# Patient Record
Sex: FEMALE | Race: WHITE | Employment: OTHER | URBAN - METROPOLITAN AREA
[De-identification: names, ages, dates, MRNs, and addresses within clinical notes are randomized per-mention and may not be internally consistent; named-entity substitution may affect disease eponyms.]

---

## 2019-07-09 ENCOUNTER — ANESTHESIA EVENT (OUTPATIENT)
Dept: ENDOSCOPY | Age: 74
End: 2019-07-09
Payer: MEDICARE

## 2019-07-09 ENCOUNTER — HOSPITAL ENCOUNTER (OUTPATIENT)
Age: 74
Setting detail: OUTPATIENT SURGERY
Discharge: HOME | End: 2019-07-09
Attending: INTERNAL MEDICINE | Admitting: INTERNAL MEDICINE
Payer: MEDICARE

## 2019-07-09 ENCOUNTER — ANESTHESIA (OUTPATIENT)
Dept: ENDOSCOPY | Age: 74
End: 2019-07-09
Payer: MEDICARE

## 2019-07-09 VITALS
OXYGEN SATURATION: 98 % | HEART RATE: 69 BPM | HEIGHT: 63 IN | BODY MASS INDEX: 29.61 KG/M2 | TEMPERATURE: 98.3 F | RESPIRATION RATE: 25 BRPM | SYSTOLIC BLOOD PRESSURE: 137 MMHG | DIASTOLIC BLOOD PRESSURE: 65 MMHG | WEIGHT: 167.11 LBS

## 2019-07-09 PROCEDURE — 76060000031 HC ANESTHESIA FIRST 0.5 HR: Performed by: INTERNAL MEDICINE

## 2019-07-09 PROCEDURE — 88305 TISSUE EXAM BY PATHOLOGIST: CPT

## 2019-07-09 PROCEDURE — 74011250636 HC RX REV CODE- 250/636: Performed by: INTERNAL MEDICINE

## 2019-07-09 PROCEDURE — 74011250636 HC RX REV CODE- 250/636

## 2019-07-09 PROCEDURE — 77030013992 HC SNR POLYP ENDOSC BSC -B: Performed by: INTERNAL MEDICINE

## 2019-07-09 PROCEDURE — 76040000019: Performed by: INTERNAL MEDICINE

## 2019-07-09 RX ORDER — CHOLECALCIFEROL (VITAMIN D3) 125 MCG
1 CAPSULE ORAL EVERY MORNING
COMMUNITY

## 2019-07-09 RX ORDER — PROPOFOL 10 MG/ML
INJECTION, EMULSION INTRAVENOUS AS NEEDED
Status: DISCONTINUED | OUTPATIENT
Start: 2019-07-09 | End: 2019-07-09 | Stop reason: HOSPADM

## 2019-07-09 RX ORDER — DEXTROMETHORPHAN/PSEUDOEPHED 2.5-7.5/.8
1.2 DROPS ORAL
Status: DISCONTINUED | OUTPATIENT
Start: 2019-07-09 | End: 2019-07-09 | Stop reason: HOSPADM

## 2019-07-09 RX ORDER — MIDAZOLAM HYDROCHLORIDE 1 MG/ML
.25-5 INJECTION, SOLUTION INTRAMUSCULAR; INTRAVENOUS
Status: DISCONTINUED | OUTPATIENT
Start: 2019-07-09 | End: 2019-07-09 | Stop reason: HOSPADM

## 2019-07-09 RX ORDER — SODIUM CHLORIDE 9 MG/ML
50 INJECTION, SOLUTION INTRAVENOUS CONTINUOUS
Status: DISCONTINUED | OUTPATIENT
Start: 2019-07-09 | End: 2019-07-09 | Stop reason: HOSPADM

## 2019-07-09 RX ORDER — PROPOFOL 10 MG/ML
INJECTION, EMULSION INTRAVENOUS
Status: DISCONTINUED | OUTPATIENT
Start: 2019-07-09 | End: 2019-07-09 | Stop reason: HOSPADM

## 2019-07-09 RX ORDER — EPINEPHRINE 0.1 MG/ML
1 INJECTION INTRACARDIAC; INTRAVENOUS
Status: DISCONTINUED | OUTPATIENT
Start: 2019-07-09 | End: 2019-07-09 | Stop reason: HOSPADM

## 2019-07-09 RX ORDER — HYDROCHLOROTHIAZIDE 12.5 MG/1
12.5 TABLET ORAL DAILY
COMMUNITY
End: 2022-06-22

## 2019-07-09 RX ORDER — FLUMAZENIL 0.1 MG/ML
0.2 INJECTION INTRAVENOUS
Status: DISCONTINUED | OUTPATIENT
Start: 2019-07-09 | End: 2019-07-09 | Stop reason: HOSPADM

## 2019-07-09 RX ORDER — ATROPINE SULFATE 0.1 MG/ML
0.4 INJECTION INTRAVENOUS
Status: DISCONTINUED | OUTPATIENT
Start: 2019-07-09 | End: 2019-07-09 | Stop reason: HOSPADM

## 2019-07-09 RX ORDER — NALOXONE HYDROCHLORIDE 0.4 MG/ML
0.4 INJECTION, SOLUTION INTRAMUSCULAR; INTRAVENOUS; SUBCUTANEOUS
Status: DISCONTINUED | OUTPATIENT
Start: 2019-07-09 | End: 2019-07-09 | Stop reason: HOSPADM

## 2019-07-09 RX ORDER — OMEPRAZOLE 40 MG/1
40 CAPSULE, DELAYED RELEASE ORAL EVERY OTHER DAY
COMMUNITY
End: 2022-06-22

## 2019-07-09 RX ADMIN — PROPOFOL 30 MG: 10 INJECTION, EMULSION INTRAVENOUS at 12:58

## 2019-07-09 RX ADMIN — PROPOFOL 100 MCG/KG/MIN: 10 INJECTION, EMULSION INTRAVENOUS at 12:58

## 2019-07-09 RX ADMIN — PROPOFOL 20 MG: 10 INJECTION, EMULSION INTRAVENOUS at 13:03

## 2019-07-09 RX ADMIN — PROPOFOL 20 MG: 10 INJECTION, EMULSION INTRAVENOUS at 13:00

## 2019-07-09 RX ADMIN — PROPOFOL 20 MG: 10 INJECTION, EMULSION INTRAVENOUS at 13:02

## 2019-07-09 RX ADMIN — PROPOFOL 10 MG: 10 INJECTION, EMULSION INTRAVENOUS at 13:19

## 2019-07-09 RX ADMIN — SODIUM CHLORIDE 50 ML/HR: 900 INJECTION, SOLUTION INTRAVENOUS at 12:06

## 2019-07-09 NOTE — ANESTHESIA POSTPROCEDURE EVALUATION
Procedure(s):  COLONOSCOPY  ENDOSCOPIC POLYPECTOMY. No value filed. Anesthesia Post Evaluation        Patient location during evaluation: bedside  Level of consciousness: awake  Pain management: satisfactory to patient  Airway patency: patent  Anesthetic complications: no  Cardiovascular status: acceptable  Respiratory status: acceptable  Hydration status: acceptable        Vitals Value Taken Time   /66 7/9/2019  1:51 PM   Temp     Pulse 67 7/9/2019  1:52 PM   Resp 16 7/9/2019  1:52 PM   SpO2 100 % 7/9/2019  1:52 PM   Vitals shown include unvalidated device data.

## 2019-07-09 NOTE — DISCHARGE INSTRUCTIONS
[address] [facility]. Lona Champagne M.D.  [phone]            COLON DISCHARGE INSTRUCTIONS       2019    Sabi Mcdonough  :  1945  Janny Medical Record Number:  [id number]      COLONOSCOPY FINDINGS:  Your colonoscopy showed two small polyps that were removed and sent to pathology, diverticulosis and small internal hemorrhoids. DISCOMFORT:  Redness at IV site- apply warm compress to area; if redness or soreness persist- contact your physician  There may be a slight amount of blood passed from the rectum  Gaseous discomfort- walking, belching will help relieve any discomfort  You may not operate a vehicle for 12 hours  You may not engage in an occupation involving machinery or appliances for rest of today  You may not drink alcoholic beverages for at least 12 hours  Avoid making any critical decisions for at least 24 hour  DIET:   High fiber diet. - however -  remember your colon is empty and a heavy meal will produce gas. Avoid these foods:  vegetables, fried / greasy foods, carbonated drinks for today     ACTIVITY:  You may resume your normal daily activities it is recommended that you spend the remainder of the day resting -  avoid any strenuous activity. CALL M.D. ANY SIGN OF:   Increasing pain, nausea, vomiting  Abdominal distension (swelling)  New increased bleeding (oral or rectal)  Fever (chills)  Pain in chest area  Bloody discharge from nose or mouth   Shortness of breath    Follow-up Instructions:   Call Dr. Debbie Gallagher if any questions or problems. Telephone # [phone]  Biopsy results will be available in  5 to 7 days  Should have a repeat colonoscopy in 3 to 5 years, based on pathology results.

## 2019-07-09 NOTE — ROUTINE PROCESS
Benjamin Going  1945  [id number]    Situation:  Verbal report received from: Carmita Davies  Procedure: Procedure(s):  COLONOSCOPY  ENDOSCOPIC POLYPECTOMY    Background:    Preoperative diagnosis: FAMILY HISTORY COLON CANCER, PERSONAL HISTORY COLON POLYPS  Postoperative diagnosis: Diverticulosis, polyps. :  Dr. Cristiano Chavez  Assistant(s): Endoscopy RN-1: Rocky Burks RN  Endoscopy RN-2: Peter Rodriguez RN    Specimens:   ID Type Source Tests Collected by Time Destination   1 : cecum polyp Preservative Cecum  Vivienne Sutton MD 7/9/2019 1311 Pathology   2 : transverse colon polyp Preservative Colon, Transverse  Vivienne Sutton MD 7/9/2019 1313 Pathology     H. Pylori  no    Assessment:  Intra-procedure medications     Anesthesia gave intra-procedure sedation and medications, see anesthesia flow sheet yes    Intravenous fluids: NS@ KVO     Vital signs stable     Abdominal assessment: round and soft     Recommendation:  Discharge patient per MD order.   Family or Friend   Permission to share finding with family or friend yes

## 2019-07-09 NOTE — ANESTHESIA PREPROCEDURE EVALUATION
Relevant Problems   No relevant active problems       Anesthetic History   No history of anesthetic complications            Review of Systems / Medical History  Patient summary reviewed, nursing notes reviewed and pertinent labs reviewed    Pulmonary  Within defined limits                 Neuro/Psych   Within defined limits           Cardiovascular    Hypertension                   GI/Hepatic/Renal     GERD           Endo/Other  Within defined limits           Other Findings              Physical Exam    Airway  Mallampati: II  TM Distance: 4 - 6 cm  Neck ROM: normal range of motion   Mouth opening: Normal     Cardiovascular    Rhythm: regular  Rate: normal         Dental  No notable dental hx       Pulmonary  Breath sounds clear to auscultation               Abdominal         Other Findings            Anesthetic Plan    ASA: 2              Anesthetic plan and risks discussed with: Patient

## 2019-07-09 NOTE — PROCEDURES
Chantel Leon M.D.  [phone]            2019          Colonoscopy Operative Report  Sean Lopez  :  1945  Janny Medical Record Number:  [id number]      Indications:    Family history of coloretal cancer (screening only), Personal history of colon polyps (screening only)     :  Chi Rodriguez MD    Referring Provider: Huong Acuna MD    Sedation:  MAC anesthesia    Pre-Procedural Exam:      Airway: clear,  No airway problems anticipated  Heart: RRR, without gallops or rubs  Lungs: clear bilaterally without wheezes, crackles, or rhonchi  Abdomen: soft, nontender, nondistended, bowel sounds present  Mental Status: awake, alert and oriented to person, place and time     Procedure Details:  After informed consent was obtained with all risks and benefits of procedure explained and preoperative exam completed, the patient was taken to the endoscopy suite and placed in the left lateral decubitus position. Upon sequential sedation as per above, a digital rectal exam was performed. The Olympus videocolonoscope  was inserted in the rectum and carefully advanced to the cecum, which was identified by the ileocecal valve and appendiceal orifice. The quality of preparation was good. The colonoscope was slowly withdrawn with careful inspection and evaluation between folds. Retroflexion in the rectum was performed. Findings:   Terminal Ileum: normal  Cecum: 1  Sessile polyp(s), the largest 3 mm in size;  Ascending Colon: normal  Transverse Colon: 1  Sessile polyp(s), the largest 5 mm in size; Descending Colon: normal  Sigmoid: no mucosal lesion appreciated  moderate diverticulosis; Rectum: no mucosal lesion appreciated  Grade 1 internal hemorrhoid(s);     Interventions:  2 complete polypectomy were performed using cold and hot snare  and the polyps were  retrieved    Specimen Removed:  specimen #1, 3 mm in size, located in the cecum removed by cold snare and retrieved for pathology  #2, 5 mm in size, located in the transverse colon removed by snare cautery and retrieved for pathology    Complications: None. EBL:  None. Impression:  Two polyps removed and sent to pathology, otherwise mucosa within normal                         Sigmoid diverticulosis with tortuous lumen, pediatric scope was used to pass. Recommendations:  -If adenoma is present, repeat colonoscopy in 3 years.   -High fiber diet.    -Resume normal medication(s). Discharge Disposition:  Home in the company of a  when able to ambulate.     Julee Diop MD  7/9/2019  1:23 PM

## 2019-07-09 NOTE — PERIOP NOTES
1257  Anesthesia staff at patient's bedside administering anesthesia and monitoring patients vital signs throughout procedure. See anesthesia note. 1322  Endoscope was pre-cleaned at bedside immediately following procedure by Malcom Holman endo tech. 1324  Patient tolerated procedure without problems. Abdomen soft and patient arousable and voices no complaints Report received from CRNA, see anesthesia note. Patient transported to endoscopy recovery area. Report given to Mika Stephens RN.

## 2019-07-09 NOTE — H&P
Amador Hendricks M.D.  [phone]            History and Physical       NAME:  Brett Cespedes   :   1945   MRN:   [id number]           Consult Date: 2019 12:57 PM    Chief Complaint:  Colon cancer screening    History of Present Illness:  Patient is a 68 y.o. who is seen for colon cancer screening. Denies any ongoing GI complaints. She has history of colon polyps and family history of colon cancer, her father and brother. PMH:  Past Medical History:   Diagnosis Date    Cancer ([address]     uterin, endometrial     GERD (gastroesophageal reflux disease)     Hypertension        PSH:  Past Surgical History:   Procedure Laterality Date    HX OPEN CHOLECYSTECTOMY      HX ORTHOPAEDIC Bilateral     arthroscopic knee    HX OTHER SURGICAL Right     kidney donation    HX TOTAL ABDOMINAL HYSTERECTOMY         Allergies: Allergies   Allergen Reactions    Demerol [Meperidine] Nausea and Vomiting       Home Medications:  Prior to Admission Medications   Prescriptions Last Dose Informant Patient Reported? Taking? cholecalciferol, vitamin D3, (VITAMIN D3) 2,000 unit tab   Yes Yes   Sig: Take 1 Tab by mouth daily. Indications: Prevention of Vitamin D Deficiency   hydroCHLOROthiazide (HYDRODIURIL) 12.5 mg tablet   Yes Yes   Sig: Take 12.5 mg by mouth daily. Indications: high blood pressure   omeprazole (PRILOSEC) 40 mg capsule   Yes Yes   Sig: Take 40 mg by mouth every other day.  Indications: gastroesophageal reflux disease      Facility-Administered Medications: None       Hospital Medications:  Current Facility-Administered Medications   Medication Dose Route Frequency    0.9% sodium chloride infusion  50 mL/hr IntraVENous CONTINUOUS    midazolam (VERSED) injection 0.25-5 mg  0.25-5 mg IntraVENous Multiple    naloxone (NARCAN) injection 0.4 mg  0.4 mg IntraVENous Multiple    flumazenil (ROMAZICON) 0.1 mg/mL injection 0.2 mg  0.2 mg IntraVENous Multiple    simethicone (MYLICON) 51BH/0.4MM oral drops 80 mg  1.2 mL Oral Multiple    atropine injection 0.4 mg  0.4 mg IntraVENous ONCE PRN    EPINEPHrine (ADRENALIN) 0.1 mg/mL syringe 1 mg  1 mg Endoscopically ONCE PRN       Social History:  Social History     Tobacco Use    Smoking status: Former Smoker     Packs/day: 0.25     Years: 10.00     Pack years: 2.50     Last attempt to quit:      Years since quittin.5    Smokeless tobacco: Never Used   Substance Use Topics    Alcohol use: Yes     Alcohol/week: 1.2 oz     Types: 2 Glasses of wine per week       Family History:  Family History   Problem Relation Age of Onset    Cancer Father 79        colon/liver    Cancer Brother 79        colon             Review of Systems:      Constitutional: negative fever, negative chills, negative weight loss  Eyes:   negative visual changes  ENT:   negative sore throat, tongue or lip swelling  Respiratory:  negative cough, negative dyspnea  Cards:  negative for chest pain, palpitations, lower extremity edema  GI:   See HPI  :  negative for frequency, dysuria  Integument:  negative for rash and pruritus  Heme:  negative for easy bruising and gum/nose bleeding  Musculoskel: negative for myalgias,  back pain and muscle weakness  Neuro: negative for headaches, dizziness, vertigo  Psych:  negative for feelings of anxiety, depression       Objective:     Patient Vitals for the past 8 hrs:   BP Temp Pulse Resp SpO2 Height Weight   19 1153 144/74 98.3 °F (36.8 °C) 77 15 98 % 5' 3\" (1.6 m) 75.8 kg (167 lb 1.7 oz)     No intake/output data recorded. No intake/output data recorded. EXAM:     NEURO-a&o   HEENT-wnl   LUNGS-clear    COR-regular rate and rhythym     ABD-soft , no tenderness, no rebound, good bs     EXT-no edema     Data Review     No results for input(s): WBC, HGB, HCT, PLT, HGBEXT, HCTEXT, PLTEXT in the last 72 hours. No results for input(s): NA, K, CL, CO2, BUN, CREA, GLU, PHOS, CA in the last 72 hours.   No results for input(s): SGOT, GPT, AP, TBIL, TP, ALB, GLOB, GGT, AML, LPSE in the last 72 hours. No lab exists for component: AMYP, HLPSE  No results for input(s): INR, PTP, APTT in the last 72 hours. No lab exists for component: INREXT    There is no problem list on file for this patient. Assessment:   · Colon cancer screening   Plan:   · Colonoscopy today.      Signed By: Christine Fontaine MD     7/9/2019  12:57 PM

## 2020-01-24 ENCOUNTER — HOSPITAL ENCOUNTER (OUTPATIENT)
Dept: MAMMOGRAPHY | Age: 75
Discharge: HOME | End: 2020-01-24
Attending: FAMILY MEDICINE
Payer: MEDICARE

## 2020-01-24 DIAGNOSIS — Z12.31 VISIT FOR SCREENING MAMMOGRAM: ICD-10-CM

## 2020-01-24 PROCEDURE — 77067 SCR MAMMO BI INCL CAD: CPT

## 2021-01-25 ENCOUNTER — TRANSCRIBE ORDER (OUTPATIENT)
Dept: SCHEDULING | Age: 76
End: 2021-01-25

## 2021-01-25 DIAGNOSIS — Z78.0 MENOPAUSE: Primary | ICD-10-CM

## 2021-02-02 ENCOUNTER — HOSPITAL ENCOUNTER (OUTPATIENT)
Dept: MAMMOGRAPHY | Age: 76
Discharge: HOME | End: 2021-02-02
Attending: FAMILY MEDICINE
Payer: MEDICARE

## 2021-02-02 DIAGNOSIS — Z78.0 MENOPAUSE: ICD-10-CM

## 2021-02-02 PROCEDURE — 77080 DXA BONE DENSITY AXIAL: CPT

## 2021-04-23 ENCOUNTER — TRANSCRIBE ORDER (OUTPATIENT)
Dept: SCHEDULING | Age: 76
End: 2021-04-23

## 2021-04-23 DIAGNOSIS — Z12.31 SCREENING MAMMOGRAM FOR HIGH-RISK PATIENT: Primary | ICD-10-CM

## 2021-04-29 ENCOUNTER — HOSPITAL ENCOUNTER (OUTPATIENT)
Dept: MAMMOGRAPHY | Age: 76
Discharge: HOME | End: 2021-04-29
Attending: FAMILY MEDICINE
Payer: MEDICARE

## 2021-04-29 DIAGNOSIS — Z12.31 SCREENING MAMMOGRAM FOR HIGH-RISK PATIENT: ICD-10-CM

## 2021-04-29 PROCEDURE — 77067 SCR MAMMO BI INCL CAD: CPT

## 2022-03-08 ENCOUNTER — TRANSCRIBE ORDER (OUTPATIENT)
Dept: SCHEDULING | Age: 77
End: 2022-03-08

## 2022-03-08 DIAGNOSIS — M25.512 BILATERAL SHOULDER PAIN, UNSPECIFIED CHRONICITY: ICD-10-CM

## 2022-03-08 DIAGNOSIS — M19.011 OSTEOARTHRITIS OF RIGHT GLENOHUMERAL JOINT: Primary | ICD-10-CM

## 2022-03-08 DIAGNOSIS — M25.511 BILATERAL SHOULDER PAIN, UNSPECIFIED CHRONICITY: ICD-10-CM

## 2022-03-09 ENCOUNTER — TRANSCRIBE ORDER (OUTPATIENT)
Dept: SCHEDULING | Age: 77
End: 2022-03-09

## 2022-03-09 DIAGNOSIS — M25.511 BILATERAL SHOULDER PAIN, UNSPECIFIED CHRONICITY: ICD-10-CM

## 2022-03-09 DIAGNOSIS — M19.012 OSTEOARTHRITIS OF LEFT GLENOHUMERAL JOINT: ICD-10-CM

## 2022-03-09 DIAGNOSIS — M19.011 OSTEOARTHRITIS OF RIGHT GLENOHUMERAL JOINT: Primary | ICD-10-CM

## 2022-03-09 DIAGNOSIS — M25.512 BILATERAL SHOULDER PAIN, UNSPECIFIED CHRONICITY: ICD-10-CM

## 2022-03-14 ENCOUNTER — HOSPITAL ENCOUNTER (OUTPATIENT)
Dept: CT IMAGING | Age: 77
Discharge: HOME | End: 2022-03-14
Attending: ORTHOPAEDIC SURGERY
Payer: MEDICARE

## 2022-03-14 DIAGNOSIS — M19.012 OSTEOARTHRITIS OF LEFT GLENOHUMERAL JOINT: ICD-10-CM

## 2022-03-14 DIAGNOSIS — M25.512 BILATERAL SHOULDER PAIN, UNSPECIFIED CHRONICITY: ICD-10-CM

## 2022-03-14 DIAGNOSIS — M19.011 OSTEOARTHRITIS OF RIGHT GLENOHUMERAL JOINT: ICD-10-CM

## 2022-03-14 DIAGNOSIS — M25.511 BILATERAL SHOULDER PAIN, UNSPECIFIED CHRONICITY: ICD-10-CM

## 2022-03-14 PROCEDURE — 73200 CT UPPER EXTREMITY W/O DYE: CPT

## 2022-04-21 ENCOUNTER — TRANSCRIBE ORDER (OUTPATIENT)
Dept: SCHEDULING | Age: 77
End: 2022-04-21

## 2022-04-21 DIAGNOSIS — Z12.31 ENCOUNTER FOR SCREENING MAMMOGRAM FOR MALIGNANT NEOPLASM OF BREAST: Primary | ICD-10-CM

## 2022-05-10 ENCOUNTER — HOSPITAL ENCOUNTER (OUTPATIENT)
Dept: MAMMOGRAPHY | Age: 77
Discharge: HOME | End: 2022-05-10
Attending: FAMILY MEDICINE
Payer: MEDICARE

## 2022-05-10 DIAGNOSIS — Z12.31 ENCOUNTER FOR SCREENING MAMMOGRAM FOR MALIGNANT NEOPLASM OF BREAST: ICD-10-CM

## 2022-05-10 PROCEDURE — 77067 SCR MAMMO BI INCL CAD: CPT

## 2022-06-22 ENCOUNTER — HOSPITAL ENCOUNTER (OUTPATIENT)
Dept: PREADMISSION TESTING | Age: 77
Discharge: HOME | End: 2022-06-22
Payer: MEDICARE

## 2022-06-22 VITALS
OXYGEN SATURATION: 96 % | RESPIRATION RATE: 16 BRPM | TEMPERATURE: 96.9 F | HEIGHT: 63 IN | HEART RATE: 77 BPM | SYSTOLIC BLOOD PRESSURE: 142 MMHG | BODY MASS INDEX: 29.88 KG/M2 | DIASTOLIC BLOOD PRESSURE: 64 MMHG | WEIGHT: 168.6 LBS

## 2022-06-22 LAB
ABO + RH BLD: NORMAL
ALBUMIN SERPL-MCNC: 3.7 G/DL (ref 3.5–5)
ALBUMIN/GLOB SERPL: 1.1 {RATIO} (ref 1.1–2.2)
ALP SERPL-CCNC: 71 U/L (ref 45–117)
ALT SERPL-CCNC: 20 U/L (ref 12–78)
ANION GAP SERPL CALC-SCNC: 8 MMOL/L (ref 5–15)
APPEARANCE UR: CLEAR
APTT PPP: 25.9 SEC (ref 22.1–31)
AST SERPL-CCNC: 17 U/L (ref 15–37)
BACTERIA URNS QL MICRO: NEGATIVE /HPF
BASOPHILS # BLD: 0.1 K/UL (ref 0–0.1)
BASOPHILS NFR BLD: 1 % (ref 0–1)
BILIRUB SERPL-MCNC: 0.3 MG/DL (ref 0.2–1)
BILIRUB UR QL: NEGATIVE
BLOOD GROUP ANTIBODIES SERPL: NORMAL
BUN SERPL-MCNC: 12 MG/DL (ref 6–20)
BUN/CREAT SERPL: 21 (ref 12–20)
CALCIUM SERPL-MCNC: 9.8 MG/DL (ref 8.5–10.1)
CHLORIDE SERPL-SCNC: 108 MMOL/L (ref 97–108)
CO2 SERPL-SCNC: 27 MMOL/L (ref 21–32)
COLOR UR: NORMAL
CREAT SERPL-MCNC: 0.57 MG/DL (ref 0.55–1.02)
DIFFERENTIAL METHOD BLD: NORMAL
EOSINOPHIL # BLD: 0.2 K/UL (ref 0–0.4)
EOSINOPHIL NFR BLD: 3 % (ref 0–7)
EPITH CASTS URNS QL MICRO: NORMAL /LPF
ERYTHROCYTE [DISTWIDTH] IN BLOOD BY AUTOMATED COUNT: 14 % (ref 11.5–14.5)
EST. AVERAGE GLUCOSE BLD GHB EST-MCNC: 114 MG/DL
GLOBULIN SER CALC-MCNC: 3.3 G/DL (ref 2–4)
GLUCOSE SERPL-MCNC: 94 MG/DL (ref 65–100)
GLUCOSE UR STRIP.AUTO-MCNC: NEGATIVE MG/DL
HBA1C MFR BLD: 5.6 % (ref 4–5.6)
HCT VFR BLD AUTO: 44.6 % (ref 35–47)
HGB BLD-MCNC: 14.4 G/DL (ref 11.5–16)
HGB UR QL STRIP: NEGATIVE
HYALINE CASTS URNS QL MICRO: NORMAL /LPF (ref 0–2)
IMM GRANULOCYTES # BLD AUTO: 0 K/UL (ref 0–0.04)
IMM GRANULOCYTES NFR BLD AUTO: 0 % (ref 0–0.5)
INR PPP: 1 (ref 0.9–1.1)
KETONES UR QL STRIP.AUTO: NEGATIVE MG/DL
LEUKOCYTE ESTERASE UR QL STRIP.AUTO: NEGATIVE
LYMPHOCYTES # BLD: 1.6 K/UL (ref 0.8–3.5)
LYMPHOCYTES NFR BLD: 21 % (ref 12–49)
MCH RBC QN AUTO: 29.4 PG (ref 26–34)
MCHC RBC AUTO-ENTMCNC: 32.3 G/DL (ref 30–36.5)
MCV RBC AUTO: 91 FL (ref 80–99)
MONOCYTES # BLD: 0.7 K/UL (ref 0–1)
MONOCYTES NFR BLD: 9 % (ref 5–13)
NEUTS SEG # BLD: 5 K/UL (ref 1.8–8)
NEUTS SEG NFR BLD: 66 % (ref 32–75)
NITRITE UR QL STRIP.AUTO: NEGATIVE
NRBC # BLD: 0 K/UL (ref 0–0.01)
NRBC BLD-RTO: 0 PER 100 WBC
PH UR STRIP: 5.5 [PH] (ref 5–8)
PLATELET # BLD AUTO: 265 K/UL (ref 150–400)
PMV BLD AUTO: 9.2 FL (ref 8.9–12.9)
POTASSIUM SERPL-SCNC: 4.3 MMOL/L (ref 3.5–5.1)
PROT SERPL-MCNC: 7 G/DL (ref 6.4–8.2)
PROT UR STRIP-MCNC: NEGATIVE MG/DL
PROTHROMBIN TIME: 10.3 SEC (ref 9–11.1)
RBC # BLD AUTO: 4.9 M/UL (ref 3.8–5.2)
RBC #/AREA URNS HPF: NORMAL /HPF (ref 0–5)
SODIUM SERPL-SCNC: 143 MMOL/L (ref 136–145)
SP GR UR REFRACTOMETRY: <1.005 (ref 1–1.03)
SPECIMEN EXP DATE BLD: NORMAL
THERAPEUTIC RANGE,PTTT: NORMAL SECS (ref 58–77)
UA: UC IF INDICATED,UAUC: NORMAL
UROBILINOGEN UR QL STRIP.AUTO: 0.2 EU/DL (ref 0.2–1)
WBC # BLD AUTO: 7.6 K/UL (ref 3.6–11)
WBC URNS QL MICRO: NORMAL /HPF (ref 0–4)

## 2022-06-22 PROCEDURE — 80053 COMPREHEN METABOLIC PANEL: CPT

## 2022-06-22 PROCEDURE — 86900 BLOOD TYPING SEROLOGIC ABO: CPT

## 2022-06-22 PROCEDURE — 93005 ELECTROCARDIOGRAM TRACING: CPT

## 2022-06-22 PROCEDURE — 85610 PROTHROMBIN TIME: CPT

## 2022-06-22 PROCEDURE — 81001 URINALYSIS AUTO W/SCOPE: CPT

## 2022-06-22 PROCEDURE — 85730 THROMBOPLASTIN TIME PARTIAL: CPT

## 2022-06-22 PROCEDURE — 85025 COMPLETE CBC W/AUTO DIFF WBC: CPT

## 2022-06-22 PROCEDURE — 83036 HEMOGLOBIN GLYCOSYLATED A1C: CPT

## 2022-06-22 PROCEDURE — 36415 COLL VENOUS BLD VENIPUNCTURE: CPT

## 2022-06-22 RX ORDER — UBIDECARENONE 50 MG
2 CAPSULE ORAL 2 TIMES DAILY
COMMUNITY

## 2022-06-22 RX ORDER — ERGOCALCIFEROL 1.25 MG/1
50000 CAPSULE ORAL
COMMUNITY

## 2022-06-22 RX ORDER — ASCORBIC ACID 500 MG
500 TABLET ORAL EVERY MORNING
COMMUNITY

## 2022-06-22 NOTE — H&P (VIEW-ONLY)
History and Physical    Patient: Dena Bonner MRN: [id number]  SSN: [id number]    YOB: 1945  Age: 77 y.o. Sex: female      CC: Right shoulder pain    Subjective: Dena Bonner is a 77 y.o. female referred for pre-operative evaluation by Dr. Stephani Fowler for surgery on 22. Rajani Mcintosh notes that her shoulder has been painful for many years but now the pain is limiting her activity. She notes both shoulders are painful and she will be replacing the left one in January. She is LHD. She notes limited ROM to shoulder. The patient was evaluated in the surgeon's office and it was determined that the most appropriate plan of care is to proceed with surgical intervention. Patient's PCP Kanu Gipson MD    Past Medical History:   Diagnosis Date    Endometrial cancer ([address]     Exocrine pancreatic insufficiency     GERD (gastroesophageal reflux disease)     H/O kidney donation      Past Surgical History:   Procedure Laterality Date    COLONOSCOPY N/A 2019    COLONOSCOPY performed by Ernst Hendricks MD at [facility]    HX HIP REPLACEMENT Bilateral     HX KNEE ARTHROSCOPY Bilateral     HX OPEN CHOLECYSTECTOMY      HX ORTHOPAEDIC Left     [employer] RELEASE    HX OTHER SURGICAL Right     kidney donation    HX TOTAL ABDOMINAL HYSTERECTOMY        Family History   Problem Relation Age of Onset    Cancer Father 79        colon/liver    Cancer Brother 79        colon    Breast Cancer Maternal Grandmother     Breast Cancer Maternal Aunt      Social History     Tobacco Use    Smoking status: Former Smoker     Packs/day: 0.25     Years: 10.00     Pack years: 2.50     Types: Cigarettes     Quit date:      Years since quittin.5    Smokeless tobacco: Never Used   Substance Use Topics    Alcohol use:  Yes     Alcohol/week: 2.0 standard drinks     Types: 2 Glasses of wine per week    Drug use: Never      Prior to Admission medications Medication Sig Start Date End Date Taking? Authorizing Provider   ubidecarenone/vitamin E mixed (COQ10  PO) Take 1 Tablet by mouth two (2) times a day. Yes Provider, Historical   lipase/protease/amylase (ZENPEP PO) Take 2 Caplet by mouth three (3) times daily (with meals). Yes Provider, Historical   ascorbic acid, vitamin C, (Vitamin C) 500 mg tablet Take 500 mg by mouth Every morning. Yes Provider, Historical   ergocalciferol (Vitamin D2) 1,250 mcg (50,000 unit) capsule Take 50,000 Units by mouth every seven (7) days. Yes Provider, Historical   lipase/protease/amylase (ZENPEP PO) Take 1 Caplet by mouth. WITH EVERY SNACK   Yes Provider, Historical   red yeast rice 600 mg tab Take 2 Caplet by mouth two (2) times a day. Yes Provider, Historical   cholecalciferol, vitamin D3, (VITAMIN D3) 2,000 unit tab Take 1 Tablet by mouth Every morning. Indications: prevention of vitamin D deficiency   Yes Provider, Historical        Allergies   Allergen Reactions    Demerol [Meperidine] Nausea and Vomiting       Review of Systems:  Constitutional: Negative for chills and fever  HENT: Negative for congestion and sore throat  Eyes: negative for blurred vision and double vision  Respiratory: Negative for cough, shortness of breath and wheezing  Mouth: Negative for loose, broken or chipped teeth. Cardiovascular: Negative for chest pain and palpitations  Gastrointestinal: Negative for abdominal pain, constipation, diarrhea and nausea  Genitourinary: Negative for dysuria and hematuria  Musculoskeletal: Bilateral shoulder pain  Skin: Negative for rash, open wounds.    Neurological: Negative for dizziness, tremors and headaches  Psychiatric: Negative for anxiety    Objective:     Vitals:    06/22/22 0951   BP: (!) 142/64   Pulse: 77   Resp: 16   Temp: 96.9 °F (36.1 °C)   SpO2: 96%   Weight: 76.5 kg (168 lb 9.6 oz)   Height: 5' 3\" (1.6 m)        Physical Exam:  General Appearance: Alert, Well Appearing and in no distress  Mental Status: Normal mood, behavior, speech and dress  Neck: Normal appearance externally  Ears: External canal no drainage  Nose: Normal external appearance and no drainage   Chest: Clear to auscultation, no wheezes, rales or rhonchi  Heart: Normal rate, regular rhythm, no murmurs, rubs, clicks or gallops  Skin: Normal color, no lesions externally  Abdomen: Not examined  Neuro: Not examined  Musculoskeletal: Limited ROM to both shoulders    Recent Results (from the past 168 hour(s))   CBC WITH AUTOMATED DIFF    Collection Time: 06/22/22 10:30 AM   Result Value Ref Range    WBC 7.6 3.6 - 11.0 K/uL    RBC 4.90 3.80 - 5.20 M/uL    HGB 14.4 11.5 - 16.0 g/dL    HCT 44.6 35.0 - 47.0 %    MCV 91.0 80.0 - 99.0 FL    MCH 29.4 26.0 - 34.0 PG    MCHC 32.3 30.0 - 36.5 g/dL    RDW 14.0 11.5 - 14.5 %    PLATELET 351 208 - 680 K/uL    MPV 9.2 8.9 - 12.9 FL    NRBC 0.0 0  WBC    ABSOLUTE NRBC 0.00 0.00 - 0.01 K/uL    NEUTROPHILS 66 32 - 75 %    LYMPHOCYTES 21 12 - 49 %    MONOCYTES 9 5 - 13 %    EOSINOPHILS 3 0 - 7 %    BASOPHILS 1 0 - 1 %    IMMATURE GRANULOCYTES 0 0.0 - 0.5 %    ABS. NEUTROPHILS 5.0 1.8 - 8.0 K/UL    ABS. LYMPHOCYTES 1.6 0.8 - 3.5 K/UL    ABS. MONOCYTES 0.7 0.0 - 1.0 K/UL    ABS. EOSINOPHILS 0.2 0.0 - 0.4 K/UL    ABS. BASOPHILS 0.1 0.0 - 0.1 K/UL    ABS. IMM. GRANS. 0.0 0.00 - 0.04 K/UL    DF AUTOMATED     METABOLIC PANEL, COMPREHENSIVE    Collection Time: 06/22/22 10:30 AM   Result Value Ref Range    Sodium 143 136 - 145 mmol/L    Potassium 4.3 3.5 - 5.1 mmol/L    Chloride 108 97 - 108 mmol/L    CO2 27 21 - 32 mmol/L    Anion gap 8 5 - 15 mmol/L    Glucose 94 65 - 100 mg/dL    BUN 12 6 - 20 MG/DL    Creatinine 0.57 0.55 - 1.02 MG/DL    BUN/Creatinine ratio 21 (H) 12 - 20      GFR est AA >60 >60 ml/min/1.73m2    GFR est non-AA >60 >60 ml/min/1.73m2    Calcium 9.8 8.5 - 10.1 MG/DL    Bilirubin, total 0.3 0.2 - 1.0 MG/DL    ALT (SGPT) 20 12 - 78 U/L    AST (SGOT) 17 15 - 37 U/L    Alk. phosphatase 71 45 - 117 U/L    Protein, total 7.0 6.4 - 8.2 g/dL    Albumin 3.7 3.5 - 5.0 g/dL    Globulin 3.3 2.0 - 4.0 g/dL    A-G Ratio 1.1 1.1 - 2.2     HEMOGLOBIN A1C WITH EAG    Collection Time: 06/22/22 10:30 AM   Result Value Ref Range    Hemoglobin A1c 5.6 4.0 - 5.6 %    Est. average glucose 114 mg/dL   CULTURE, MRSA    Collection Time: 06/22/22 10:30 AM    Specimen: Nares; Nasal   Result Value Ref Range    Special Requests: NO SPECIAL REQUESTS      Culture result: MRSA NOT PRESENT      Culture result:        Screening of patient nares for MRSA is for surveillance purposes and, if positive, to facilitate isolation considerations in high risk settings. It is not intended for automatic decolonization interventions per se as regimens are not sufficiently effective to warrant routine use.    PROTHROMBIN TIME + INR    Collection Time: 06/22/22 10:30 AM   Result Value Ref Range    INR 1.0 0.9 - 1.1      Prothrombin time 10.3 9.0 - 11.1 sec   PTT    Collection Time: 06/22/22 10:30 AM   Result Value Ref Range    aPTT 25.9 22.1 - 31.0 sec    aPTT, therapeutic range     58.0 - 77.0 SECS   URINALYSIS W/ REFLEX CULTURE    Collection Time: 06/22/22 10:30 AM    Specimen: Urine   Result Value Ref Range    Color YELLOW/STRAW      Appearance CLEAR CLEAR      Specific gravity <1.005 1.003 - 1.030    pH (UA) 5.5 5.0 - 8.0      Protein Negative NEG mg/dL    Glucose Negative NEG mg/dL    Ketone Negative NEG mg/dL    Bilirubin Negative NEG      Blood Negative NEG      Urobilinogen 0.2 0.2 - 1.0 EU/dL    Nitrites Negative NEG      Leukocyte Esterase Negative NEG      UA:UC IF INDICATED CULTURE NOT INDICATED BY UA RESULT CNI      WBC 0-4 0 - 4 /hpf    RBC 0-5 0 - 5 /hpf    Epithelial cells FEW FEW /lpf    Bacteria Negative NEG /hpf    Hyaline cast 0-2 0 - 2 /lpf   TYPE & SCREEN    Collection Time: 06/22/22 10:30 AM   Result Value Ref Range    Crossmatch Expiration 07/06/2022,2359     ABO/Rh(D) A POSITIVE     Antibody screen NEG EKG, 12 LEAD, INITIAL    Collection Time: 06/22/22 10:43 AM   Result Value Ref Range    Ventricular Rate 69 BPM    Atrial Rate 69 BPM    P-R Interval 152 ms    QRS Duration 90 ms    Q-T Interval 382 ms    QTC Calculation (Bezet) 409 ms    Calculated P Axis 68 degrees    Calculated R Axis -40 degrees    Calculated T Axis 41 degrees    Diagnosis       Normal sinus rhythm  Left axis deviation  Abnormal ECG  No previous ECGs available  Confirmed by Latosha Carbajal M.D., Marlys Jointer ([id number]) on 6/23/2022 4:46:54 PM           Assessment:     OA Bilateral Shoulders  Pre-Operative Evaluation    Plan:     RTS  MRSA negative  Labs and EKG reviewed.        Signed By: Yosi Silverman NP     June 27, 2022

## 2022-06-22 NOTE — H&P
History and Physical    Patient: Vivien Mcpherson MRN: [id number]  SSN: [id number]    YOB: 1945  Age: 77 y.o. Sex: female      CC: Right shoulder pain    Subjective: Vivien Mcpherson is a 77 y.o. female referred for pre-operative evaluation by Dr. Zulema Duncan for surgery on 22. Timothy Clifford notes that her shoulder has been painful for many years but now the pain is limiting her activity. She notes both shoulders are painful and she will be replacing the left one in January. She is LHD. She notes limited ROM to shoulder. The patient was evaluated in the surgeon's office and it was determined that the most appropriate plan of care is to proceed with surgical intervention. Patient's PCP Lance Paiz MD    Past Medical History:   Diagnosis Date    Endometrial cancer ([address]     Exocrine pancreatic insufficiency     GERD (gastroesophageal reflux disease)     H/O kidney donation      Past Surgical History:   Procedure Laterality Date    COLONOSCOPY N/A 2019    COLONOSCOPY performed by Jack Bloom MD at [facility] ENDOSCOPY    HX HIP REPLACEMENT Bilateral     HX KNEE ARTHROSCOPY Bilateral     HX OPEN CHOLECYSTECTOMY      HX ORTHOPAEDIC Left     [employer]IGGER RELEASE    HX OTHER SURGICAL Right     kidney donation    HX TOTAL ABDOMINAL HYSTERECTOMY        Family History   Problem Relation Age of Onset    Cancer Father 79        colon/liver    Cancer Brother 79        colon    Breast Cancer Maternal Grandmother     Breast Cancer Maternal Aunt      Social History     Tobacco Use    Smoking status: Former Smoker     Packs/day: 0.25     Years: 10.00     Pack years: 2.50     Types: Cigarettes     Quit date:      Years since quittin.5    Smokeless tobacco: Never Used   Substance Use Topics    Alcohol use:  Yes     Alcohol/week: 2.0 standard drinks     Types: 2 Glasses of wine per week    Drug use: Never      Prior to Admission medications Medication Sig Start Date End Date Taking? Authorizing Provider   ubidecarenone/vitamin E mixed (COQ10  PO) Take 1 Tablet by mouth two (2) times a day. Yes Provider, Historical   lipase/protease/amylase (ZENPEP PO) Take 2 Caplet by mouth three (3) times daily (with meals). Yes Provider, Historical   ascorbic acid, vitamin C, (Vitamin C) 500 mg tablet Take 500 mg by mouth Every morning. Yes Provider, Historical   ergocalciferol (Vitamin D2) 1,250 mcg (50,000 unit) capsule Take 50,000 Units by mouth every seven (7) days. Yes Provider, Historical   lipase/protease/amylase (ZENPEP PO) Take 1 Caplet by mouth. WITH EVERY SNACK   Yes Provider, Historical   red yeast rice 600 mg tab Take 2 Caplet by mouth two (2) times a day. Yes Provider, Historical   cholecalciferol, vitamin D3, (VITAMIN D3) 2,000 unit tab Take 1 Tablet by mouth Every morning. Indications: prevention of vitamin D deficiency   Yes Provider, Historical        Allergies   Allergen Reactions    Demerol [Meperidine] Nausea and Vomiting       Review of Systems:  Constitutional: Negative for chills and fever  HENT: Negative for congestion and sore throat  Eyes: negative for blurred vision and double vision  Respiratory: Negative for cough, shortness of breath and wheezing  Mouth: Negative for loose, broken or chipped teeth. Cardiovascular: Negative for chest pain and palpitations  Gastrointestinal: Negative for abdominal pain, constipation, diarrhea and nausea  Genitourinary: Negative for dysuria and hematuria  Musculoskeletal: Bilateral shoulder pain  Skin: Negative for rash, open wounds.    Neurological: Negative for dizziness, tremors and headaches  Psychiatric: Negative for anxiety    Objective:     Vitals:    06/22/22 0951   BP: (!) 142/64   Pulse: 77   Resp: 16   Temp: 96.9 °F (36.1 °C)   SpO2: 96%   Weight: 76.5 kg (168 lb 9.6 oz)   Height: 5' 3\" (1.6 m)        Physical Exam:  General Appearance: Alert, Well Appearing and in no distress  Mental Status: Normal mood, behavior, speech and dress  Neck: Normal appearance externally  Ears: External canal no drainage  Nose: Normal external appearance and no drainage   Chest: Clear to auscultation, no wheezes, rales or rhonchi  Heart: Normal rate, regular rhythm, no murmurs, rubs, clicks or gallops  Skin: Normal color, no lesions externally  Abdomen: Not examined  Neuro: Not examined  Musculoskeletal: Limited ROM to both shoulders    Recent Results (from the past 168 hour(s))   CBC WITH AUTOMATED DIFF    Collection Time: 06/22/22 10:30 AM   Result Value Ref Range    WBC 7.6 3.6 - 11.0 K/uL    RBC 4.90 3.80 - 5.20 M/uL    HGB 14.4 11.5 - 16.0 g/dL    HCT 44.6 35.0 - 47.0 %    MCV 91.0 80.0 - 99.0 FL    MCH 29.4 26.0 - 34.0 PG    MCHC 32.3 30.0 - 36.5 g/dL    RDW 14.0 11.5 - 14.5 %    PLATELET 273 420 - 753 K/uL    MPV 9.2 8.9 - 12.9 FL    NRBC 0.0 0  WBC    ABSOLUTE NRBC 0.00 0.00 - 0.01 K/uL    NEUTROPHILS 66 32 - 75 %    LYMPHOCYTES 21 12 - 49 %    MONOCYTES 9 5 - 13 %    EOSINOPHILS 3 0 - 7 %    BASOPHILS 1 0 - 1 %    IMMATURE GRANULOCYTES 0 0.0 - 0.5 %    ABS. NEUTROPHILS 5.0 1.8 - 8.0 K/UL    ABS. LYMPHOCYTES 1.6 0.8 - 3.5 K/UL    ABS. MONOCYTES 0.7 0.0 - 1.0 K/UL    ABS. EOSINOPHILS 0.2 0.0 - 0.4 K/UL    ABS. BASOPHILS 0.1 0.0 - 0.1 K/UL    ABS. IMM. GRANS. 0.0 0.00 - 0.04 K/UL    DF AUTOMATED     METABOLIC PANEL, COMPREHENSIVE    Collection Time: 06/22/22 10:30 AM   Result Value Ref Range    Sodium 143 136 - 145 mmol/L    Potassium 4.3 3.5 - 5.1 mmol/L    Chloride 108 97 - 108 mmol/L    CO2 27 21 - 32 mmol/L    Anion gap 8 5 - 15 mmol/L    Glucose 94 65 - 100 mg/dL    BUN 12 6 - 20 MG/DL    Creatinine 0.57 0.55 - 1.02 MG/DL    BUN/Creatinine ratio 21 (H) 12 - 20      GFR est AA >60 >60 ml/min/1.73m2    GFR est non-AA >60 >60 ml/min/1.73m2    Calcium 9.8 8.5 - 10.1 MG/DL    Bilirubin, total 0.3 0.2 - 1.0 MG/DL    ALT (SGPT) 20 12 - 78 U/L    AST (SGOT) 17 15 - 37 U/L    Alk. phosphatase 71 45 - 117 U/L    Protein, total 7.0 6.4 - 8.2 g/dL    Albumin 3.7 3.5 - 5.0 g/dL    Globulin 3.3 2.0 - 4.0 g/dL    A-G Ratio 1.1 1.1 - 2.2     HEMOGLOBIN A1C WITH EAG    Collection Time: 06/22/22 10:30 AM   Result Value Ref Range    Hemoglobin A1c 5.6 4.0 - 5.6 %    Est. average glucose 114 mg/dL   CULTURE, MRSA    Collection Time: 06/22/22 10:30 AM    Specimen: Nares; Nasal   Result Value Ref Range    Special Requests: NO SPECIAL REQUESTS      Culture result: MRSA NOT PRESENT      Culture result:        Screening of patient nares for MRSA is for surveillance purposes and, if positive, to facilitate isolation considerations in high risk settings. It is not intended for automatic decolonization interventions per se as regimens are not sufficiently effective to warrant routine use.    PROTHROMBIN TIME + INR    Collection Time: 06/22/22 10:30 AM   Result Value Ref Range    INR 1.0 0.9 - 1.1      Prothrombin time 10.3 9.0 - 11.1 sec   PTT    Collection Time: 06/22/22 10:30 AM   Result Value Ref Range    aPTT 25.9 22.1 - 31.0 sec    aPTT, therapeutic range     58.0 - 77.0 SECS   URINALYSIS W/ REFLEX CULTURE    Collection Time: 06/22/22 10:30 AM    Specimen: Urine   Result Value Ref Range    Color YELLOW/STRAW      Appearance CLEAR CLEAR      Specific gravity <1.005 1.003 - 1.030    pH (UA) 5.5 5.0 - 8.0      Protein Negative NEG mg/dL    Glucose Negative NEG mg/dL    Ketone Negative NEG mg/dL    Bilirubin Negative NEG      Blood Negative NEG      Urobilinogen 0.2 0.2 - 1.0 EU/dL    Nitrites Negative NEG      Leukocyte Esterase Negative NEG      UA:UC IF INDICATED CULTURE NOT INDICATED BY UA RESULT CNI      WBC 0-4 0 - 4 /hpf    RBC 0-5 0 - 5 /hpf    Epithelial cells FEW FEW /lpf    Bacteria Negative NEG /hpf    Hyaline cast 0-2 0 - 2 /lpf   TYPE & SCREEN    Collection Time: 06/22/22 10:30 AM   Result Value Ref Range    Crossmatch Expiration 07/06/2022,2359     ABO/Rh(D) A POSITIVE     Antibody screen NEG EKG, 12 LEAD, INITIAL    Collection Time: 06/22/22 10:43 AM   Result Value Ref Range    Ventricular Rate 69 BPM    Atrial Rate 69 BPM    P-R Interval 152 ms    QRS Duration 90 ms    Q-T Interval 382 ms    QTC Calculation (Bezet) 409 ms    Calculated P Axis 68 degrees    Calculated R Axis -40 degrees    Calculated T Axis 41 degrees    Diagnosis       Normal sinus rhythm  Left axis deviation  Abnormal ECG  No previous ECGs available  Confirmed by Steven Foster M.D., Dalia Blender ([id number]) on 6/23/2022 4:46:54 PM           Assessment:     OA Bilateral Shoulders  Pre-Operative Evaluation    Plan:     RTS  MRSA negative  Labs and EKG reviewed.        Signed By: Sulma Sainz NP     June 27, 2022

## 2022-06-23 LAB
ATRIAL RATE: 69 BPM
BACTERIA SPEC CULT: NORMAL
BACTERIA SPEC CULT: NORMAL
CALCULATED P AXIS, ECG09: 68 DEGREES
CALCULATED R AXIS, ECG10: -40 DEGREES
CALCULATED T AXIS, ECG11: 41 DEGREES
DIAGNOSIS, 93000: NORMAL
P-R INTERVAL, ECG05: 152 MS
Q-T INTERVAL, ECG07: 382 MS
QRS DURATION, ECG06: 90 MS
QTC CALCULATION (BEZET), ECG08: 409 MS
SERVICE CMNT-IMP: NORMAL
VENTRICULAR RATE, ECG03: 69 BPM

## 2022-07-08 ENCOUNTER — ANESTHESIA EVENT (OUTPATIENT)
Dept: SURGERY | Age: 77
End: 2022-07-08
Payer: MEDICARE

## 2022-07-11 ENCOUNTER — ANESTHESIA (OUTPATIENT)
Dept: SURGERY | Age: 77
End: 2022-07-11
Payer: MEDICARE

## 2022-07-11 ENCOUNTER — HOSPITAL ENCOUNTER (OUTPATIENT)
Age: 77
Setting detail: OUTPATIENT SURGERY
Discharge: HOME | End: 2022-07-11
Attending: ORTHOPAEDIC SURGERY | Admitting: ORTHOPAEDIC SURGERY
Payer: MEDICARE

## 2022-07-11 ENCOUNTER — APPOINTMENT (OUTPATIENT)
Dept: GENERAL RADIOLOGY | Age: 77
End: 2022-07-11
Attending: ORTHOPAEDIC SURGERY
Payer: MEDICARE

## 2022-07-11 VITALS
HEART RATE: 85 BPM | WEIGHT: 166.23 LBS | HEIGHT: 63 IN | OXYGEN SATURATION: 91 % | TEMPERATURE: 97.8 F | RESPIRATION RATE: 17 BRPM | BODY MASS INDEX: 29.45 KG/M2 | SYSTOLIC BLOOD PRESSURE: 126 MMHG | DIASTOLIC BLOOD PRESSURE: 58 MMHG

## 2022-07-11 DIAGNOSIS — R52 PAIN: Primary | ICD-10-CM

## 2022-07-11 PROCEDURE — C1776 JOINT DEVICE (IMPLANTABLE): HCPCS | Performed by: ORTHOPAEDIC SURGERY

## 2022-07-11 PROCEDURE — 77030031139 HC SUT VCRL2 J&J -A: Performed by: ORTHOPAEDIC SURGERY

## 2022-07-11 PROCEDURE — C1713 ANCHOR/SCREW BN/BN,TIS/BN: HCPCS | Performed by: ORTHOPAEDIC SURGERY

## 2022-07-11 PROCEDURE — 77030002933 HC SUT MCRYL J&J -A: Performed by: ORTHOPAEDIC SURGERY

## 2022-07-11 PROCEDURE — 77030028700 HC BLD TISS PLSM MEDT -E: Performed by: ORTHOPAEDIC SURGERY

## 2022-07-11 PROCEDURE — 77030026438 HC STYL ET INTUB CARD -A: Performed by: ANESTHESIOLOGY

## 2022-07-11 PROCEDURE — 74011000250 HC RX REV CODE- 250: Performed by: ORTHOPAEDIC SURGERY

## 2022-07-11 PROCEDURE — 74011250636 HC RX REV CODE- 250/636: Performed by: NURSE ANESTHETIST, CERTIFIED REGISTERED

## 2022-07-11 PROCEDURE — 77030040922 HC BLNKT HYPOTHRM STRY -A

## 2022-07-11 PROCEDURE — 2709999900 HC NON-CHARGEABLE SUPPLY: Performed by: ORTHOPAEDIC SURGERY

## 2022-07-11 PROCEDURE — 74011250636 HC RX REV CODE- 250/636: Performed by: ANESTHESIOLOGY

## 2022-07-11 PROCEDURE — 77030040361 HC SLV COMPR DVT MDII -B

## 2022-07-11 PROCEDURE — 76210000001 HC OR PH I REC 2.5 TO 3 HR: Performed by: ORTHOPAEDIC SURGERY

## 2022-07-11 PROCEDURE — 74011250636 HC RX REV CODE- 250/636: Performed by: ORTHOPAEDIC SURGERY

## 2022-07-11 PROCEDURE — 77030008684 HC TU ET CUF COVD -B: Performed by: ANESTHESIOLOGY

## 2022-07-11 PROCEDURE — 76010000171 HC OR TIME 2 TO 2.5 HR INTENSV-TIER 1: Performed by: ORTHOPAEDIC SURGERY

## 2022-07-11 PROCEDURE — 77030018673: Performed by: ORTHOPAEDIC SURGERY

## 2022-07-11 PROCEDURE — C9290 INJ, BUPIVACAINE LIPOSOME: HCPCS | Performed by: ANESTHESIOLOGY

## 2022-07-11 PROCEDURE — 76210000020 HC REC RM PH II FIRST 0.5 HR: Performed by: ORTHOPAEDIC SURGERY

## 2022-07-11 PROCEDURE — 74011000250 HC RX REV CODE- 250: Performed by: ANESTHESIOLOGY

## 2022-07-11 PROCEDURE — 77030038692 HC WND DEB SYS IRMX -B: Performed by: ORTHOPAEDIC SURGERY

## 2022-07-11 PROCEDURE — 77030006835 HC BLD SAW SAG STRY -B: Performed by: ORTHOPAEDIC SURGERY

## 2022-07-11 PROCEDURE — 77030002922 HC SUT FBRWRE ARTH -B: Performed by: ORTHOPAEDIC SURGERY

## 2022-07-11 PROCEDURE — 76060000035 HC ANESTHESIA 2 TO 2.5 HR: Performed by: ORTHOPAEDIC SURGERY

## 2022-07-11 PROCEDURE — 74011250637 HC RX REV CODE- 250/637: Performed by: ANESTHESIOLOGY

## 2022-07-11 PROCEDURE — 73020 X-RAY EXAM OF SHOULDER: CPT

## 2022-07-11 PROCEDURE — 77030003882 HC BIT DRL TWST BRSM -B: Performed by: ORTHOPAEDIC SURGERY

## 2022-07-11 PROCEDURE — 74011000250 HC RX REV CODE- 250: Performed by: NURSE ANESTHETIST, CERTIFIED REGISTERED

## 2022-07-11 PROCEDURE — 77030003601 HC NDL NRV BLK BBMI -A

## 2022-07-11 DEVICE — IMPLANTABLE DEVICE
Type: IMPLANTABLE DEVICE | Site: SHOULDER | Status: FUNCTIONAL
Brand: COMPREHENSIVE SHOULDER SYSTEM

## 2022-07-11 DEVICE — CEMENT BNE SIMPLEX W/GENT -- 10/PK: Type: IMPLANTABLE DEVICE | Site: SHOULDER | Status: FUNCTIONAL

## 2022-07-11 DEVICE — IMPLANTABLE DEVICE
Type: IMPLANTABLE DEVICE | Site: SHOULDER | Status: FUNCTIONAL
Brand: COMPREHENSIVE® SHOULDER SYSTEM

## 2022-07-11 DEVICE — POST ORTH MOD TRABECULAR MTL ALLIANCE: Type: IMPLANTABLE DEVICE | Site: SHOULDER | Status: FUNCTIONAL

## 2022-07-11 DEVICE — IMPLANTABLE DEVICE
Type: IMPLANTABLE DEVICE | Site: SHOULDER | Status: FUNCTIONAL
Brand: ALLIANCE™

## 2022-07-11 DEVICE — SHOULDER S1 TOT STD ANAT IMPL CAPPED S1 ZIM: Type: IMPLANTABLE DEVICE | Site: SHOULDER | Status: FUNCTIONAL

## 2022-07-11 RX ORDER — DEXAMETHASONE SODIUM PHOSPHATE 4 MG/ML
INJECTION, SOLUTION INTRA-ARTICULAR; INTRALESIONAL; INTRAMUSCULAR; INTRAVENOUS; SOFT TISSUE AS NEEDED
Status: DISCONTINUED | OUTPATIENT
Start: 2022-07-11 | End: 2022-07-11 | Stop reason: HOSPADM

## 2022-07-11 RX ORDER — KETOROLAC TROMETHAMINE 30 MG/ML
15 INJECTION, SOLUTION INTRAMUSCULAR; INTRAVENOUS
Status: DISCONTINUED | OUTPATIENT
Start: 2022-07-11 | End: 2022-07-11 | Stop reason: HOSPADM

## 2022-07-11 RX ORDER — OXYCODONE HYDROCHLORIDE 5 MG/1
5 TABLET ORAL
Status: COMPLETED | OUTPATIENT
Start: 2022-07-11 | End: 2022-07-11

## 2022-07-11 RX ORDER — ONDANSETRON 2 MG/ML
4 INJECTION INTRAMUSCULAR; INTRAVENOUS AS NEEDED
Status: DISCONTINUED | OUTPATIENT
Start: 2022-07-11 | End: 2022-07-11 | Stop reason: HOSPADM

## 2022-07-11 RX ORDER — ALBUTEROL SULFATE 0.83 MG/ML
2.5 SOLUTION RESPIRATORY (INHALATION) AS NEEDED
Status: DISCONTINUED | OUTPATIENT
Start: 2022-07-11 | End: 2022-07-11 | Stop reason: HOSPADM

## 2022-07-11 RX ORDER — ROCURONIUM BROMIDE 10 MG/ML
INJECTION, SOLUTION INTRAVENOUS AS NEEDED
Status: DISCONTINUED | OUTPATIENT
Start: 2022-07-11 | End: 2022-07-11 | Stop reason: HOSPADM

## 2022-07-11 RX ORDER — NALOXONE HYDROCHLORIDE 0.4 MG/ML
0.04 INJECTION, SOLUTION INTRAMUSCULAR; INTRAVENOUS; SUBCUTANEOUS
Status: DISCONTINUED | OUTPATIENT
Start: 2022-07-11 | End: 2022-07-11 | Stop reason: HOSPADM

## 2022-07-11 RX ORDER — SODIUM CHLORIDE 0.9 % (FLUSH) 0.9 %
5-40 SYRINGE (ML) INJECTION EVERY 8 HOURS
Status: DISCONTINUED | OUTPATIENT
Start: 2022-07-11 | End: 2022-07-11 | Stop reason: HOSPADM

## 2022-07-11 RX ORDER — FENTANYL CITRATE 50 UG/ML
INJECTION, SOLUTION INTRAMUSCULAR; INTRAVENOUS AS NEEDED
Status: DISCONTINUED | OUTPATIENT
Start: 2022-07-11 | End: 2022-07-11 | Stop reason: HOSPADM

## 2022-07-11 RX ORDER — DIPHENHYDRAMINE HYDROCHLORIDE 50 MG/ML
12.5 INJECTION, SOLUTION INTRAMUSCULAR; INTRAVENOUS AS NEEDED
Status: DISCONTINUED | OUTPATIENT
Start: 2022-07-11 | End: 2022-07-11 | Stop reason: HOSPADM

## 2022-07-11 RX ORDER — EPHEDRINE SULFATE/0.9% NACL/PF 50 MG/5 ML
SYRINGE (ML) INTRAVENOUS AS NEEDED
Status: DISCONTINUED | OUTPATIENT
Start: 2022-07-11 | End: 2022-07-11 | Stop reason: HOSPADM

## 2022-07-11 RX ORDER — FENTANYL CITRATE 50 UG/ML
25 INJECTION, SOLUTION INTRAMUSCULAR; INTRAVENOUS
Status: DISCONTINUED | OUTPATIENT
Start: 2022-07-11 | End: 2022-07-11 | Stop reason: HOSPADM

## 2022-07-11 RX ORDER — GLYCOPYRROLATE 0.2 MG/ML
INJECTION INTRAMUSCULAR; INTRAVENOUS AS NEEDED
Status: DISCONTINUED | OUTPATIENT
Start: 2022-07-11 | End: 2022-07-11 | Stop reason: HOSPADM

## 2022-07-11 RX ORDER — LIDOCAINE HYDROCHLORIDE 20 MG/ML
INJECTION, SOLUTION EPIDURAL; INFILTRATION; INTRACAUDAL; PERINEURAL AS NEEDED
Status: DISCONTINUED | OUTPATIENT
Start: 2022-07-11 | End: 2022-07-11 | Stop reason: HOSPADM

## 2022-07-11 RX ORDER — NEOSTIGMINE METHYLSULFATE 1 MG/ML
INJECTION, SOLUTION INTRAVENOUS AS NEEDED
Status: DISCONTINUED | OUTPATIENT
Start: 2022-07-11 | End: 2022-07-11 | Stop reason: HOSPADM

## 2022-07-11 RX ORDER — BUPIVACAINE HYDROCHLORIDE 2.5 MG/ML
INJECTION, SOLUTION EPIDURAL; INFILTRATION; INTRACAUDAL AS NEEDED
Status: DISCONTINUED | OUTPATIENT
Start: 2022-07-11 | End: 2022-07-11 | Stop reason: HOSPADM

## 2022-07-11 RX ORDER — SODIUM CHLORIDE, SODIUM LACTATE, POTASSIUM CHLORIDE, CALCIUM CHLORIDE 600; 310; 30; 20 MG/100ML; MG/100ML; MG/100ML; MG/100ML
125 INJECTION, SOLUTION INTRAVENOUS CONTINUOUS
Status: DISCONTINUED | OUTPATIENT
Start: 2022-07-11 | End: 2022-07-11 | Stop reason: HOSPADM

## 2022-07-11 RX ORDER — FLUMAZENIL 0.1 MG/ML
0.2 INJECTION INTRAVENOUS
Status: DISCONTINUED | OUTPATIENT
Start: 2022-07-11 | End: 2022-07-11 | Stop reason: HOSPADM

## 2022-07-11 RX ORDER — SODIUM CHLORIDE 0.9 % (FLUSH) 0.9 %
5-40 SYRINGE (ML) INJECTION AS NEEDED
Status: DISCONTINUED | OUTPATIENT
Start: 2022-07-11 | End: 2022-07-11 | Stop reason: HOSPADM

## 2022-07-11 RX ORDER — ONDANSETRON 2 MG/ML
INJECTION INTRAMUSCULAR; INTRAVENOUS AS NEEDED
Status: DISCONTINUED | OUTPATIENT
Start: 2022-07-11 | End: 2022-07-11 | Stop reason: HOSPADM

## 2022-07-11 RX ORDER — HYDROMORPHONE HYDROCHLORIDE 1 MG/ML
.25-1 INJECTION, SOLUTION INTRAMUSCULAR; INTRAVENOUS; SUBCUTANEOUS
Status: DISCONTINUED | OUTPATIENT
Start: 2022-07-11 | End: 2022-07-11 | Stop reason: HOSPADM

## 2022-07-11 RX ORDER — HYDROCODONE BITARTRATE AND ACETAMINOPHEN 5; 325 MG/1; MG/1
1 TABLET ORAL
Qty: 42 TABLET | Refills: 0 | Status: SHIPPED
Start: 2022-07-11 | End: 2022-07-18

## 2022-07-11 RX ORDER — LIDOCAINE HYDROCHLORIDE 10 MG/ML
0.1 INJECTION, SOLUTION EPIDURAL; INFILTRATION; INTRACAUDAL; PERINEURAL AS NEEDED
Status: DISCONTINUED | OUTPATIENT
Start: 2022-07-11 | End: 2022-07-11 | Stop reason: HOSPADM

## 2022-07-11 RX ORDER — CEPHALEXIN 500 MG/1
500 CAPSULE ORAL 4 TIMES DAILY
Qty: 12 CAPSULE | Refills: 0 | Status: SHIPPED
Start: 2022-07-11 | End: 2022-07-14

## 2022-07-11 RX ORDER — ACETAMINOPHEN 325 MG/1
975 TABLET ORAL ONCE
Status: COMPLETED | OUTPATIENT
Start: 2022-07-11 | End: 2022-07-11

## 2022-07-11 RX ORDER — PROPOFOL 10 MG/ML
INJECTION, EMULSION INTRAVENOUS AS NEEDED
Status: DISCONTINUED | OUTPATIENT
Start: 2022-07-11 | End: 2022-07-11 | Stop reason: HOSPADM

## 2022-07-11 RX ORDER — CELECOXIB 100 MG/1
100 CAPSULE ORAL ONCE
Status: COMPLETED | OUTPATIENT
Start: 2022-07-11 | End: 2022-07-11

## 2022-07-11 RX ORDER — MIDAZOLAM HYDROCHLORIDE 1 MG/ML
INJECTION, SOLUTION INTRAMUSCULAR; INTRAVENOUS AS NEEDED
Status: DISCONTINUED | OUTPATIENT
Start: 2022-07-11 | End: 2022-07-11

## 2022-07-11 RX ORDER — VANCOMYCIN HYDROCHLORIDE 1 G/20ML
INJECTION, POWDER, LYOPHILIZED, FOR SOLUTION INTRAVENOUS AS NEEDED
Status: DISCONTINUED | OUTPATIENT
Start: 2022-07-11 | End: 2022-07-11 | Stop reason: HOSPADM

## 2022-07-11 RX ORDER — FAMOTIDINE 10 MG/ML
INJECTION INTRAVENOUS AS NEEDED
Status: DISCONTINUED | OUTPATIENT
Start: 2022-07-11 | End: 2022-07-11 | Stop reason: HOSPADM

## 2022-07-11 RX ADMIN — BUPIVACAINE 10 ML: 13.3 INJECTION, SUSPENSION, LIPOSOMAL INFILTRATION at 07:57

## 2022-07-11 RX ADMIN — CELECOXIB 100 MG: 100 CAPSULE ORAL at 07:28

## 2022-07-11 RX ADMIN — Medication 20 MG: at 08:29

## 2022-07-11 RX ADMIN — ONDANSETRON HYDROCHLORIDE 4 MG: 2 SOLUTION INTRAMUSCULAR; INTRAVENOUS at 08:39

## 2022-07-11 RX ADMIN — Medication 10 MG: at 08:55

## 2022-07-11 RX ADMIN — GLYCOPYRROLATE 0.4 MG: 0.2 INJECTION INTRAMUSCULAR; INTRAVENOUS at 10:02

## 2022-07-11 RX ADMIN — SODIUM CHLORIDE, POTASSIUM CHLORIDE, SODIUM LACTATE AND CALCIUM CHLORIDE 125 ML/HR: 600; 310; 30; 20 INJECTION, SOLUTION INTRAVENOUS at 07:13

## 2022-07-11 RX ADMIN — FENTANYL CITRATE 50 MCG: 50 INJECTION, SOLUTION INTRAMUSCULAR; INTRAVENOUS at 08:24

## 2022-07-11 RX ADMIN — ACETAMINOPHEN 975 MG: 325 TABLET ORAL at 07:28

## 2022-07-11 RX ADMIN — CEFAZOLIN SODIUM 2 G: 1 POWDER, FOR SOLUTION INTRAMUSCULAR; INTRAVENOUS at 08:39

## 2022-07-11 RX ADMIN — PROPOFOL 150 MG: 10 INJECTION, EMULSION INTRAVENOUS at 08:24

## 2022-07-11 RX ADMIN — FENTANYL CITRATE 100 MCG: 50 INJECTION, SOLUTION INTRAMUSCULAR; INTRAVENOUS at 07:51

## 2022-07-11 RX ADMIN — LIDOCAINE HYDROCHLORIDE 60 MG: 20 INJECTION, SOLUTION EPIDURAL; INFILTRATION; INTRACAUDAL; PERINEURAL at 08:24

## 2022-07-11 RX ADMIN — FAMOTIDINE 20 MG: 10 INJECTION INTRAVENOUS at 08:39

## 2022-07-11 RX ADMIN — BUPIVACAINE HYDROCHLORIDE 20 ML: 2.5 INJECTION, SOLUTION EPIDURAL; INFILTRATION; INTRACAUDAL; PERINEURAL at 07:57

## 2022-07-11 RX ADMIN — OXYCODONE 5 MG: 5 TABLET ORAL at 13:06

## 2022-07-11 RX ADMIN — ROCURONIUM BROMIDE 30 MG: 10 INJECTION INTRAVENOUS at 08:24

## 2022-07-11 RX ADMIN — ROCURONIUM BROMIDE 20 MG: 10 INJECTION INTRAVENOUS at 09:02

## 2022-07-11 RX ADMIN — Medication 3 MG: at 10:02

## 2022-07-11 RX ADMIN — DEXAMETHASONE SODIUM PHOSPHATE 4 MG: 4 INJECTION, SOLUTION INTRAMUSCULAR; INTRAVENOUS at 08:39

## 2022-07-11 NOTE — ANESTHESIA POSTPROCEDURE EVALUATION
Procedure(s):  RIGHT TOTAL SHOULDER ARTHROPLASTY WITH BICEPS TENODESIS AND AXILLARY NERVE DISSECTION (GENERAL WITH EXPAREL REGIONAL NERVE BLOCK). general, regional    Anesthesia Post Evaluation      Multimodal analgesia: multimodal analgesia not used between 6 hours prior to anesthesia start to PACU discharge  Patient location during evaluation: PACU  Patient participation: complete - patient participated  Level of consciousness: awake  Pain management: adequate  Airway patency: patent  Anesthetic complications: no  Cardiovascular status: acceptable, blood pressure returned to baseline and hemodynamically stable  Respiratory status: acceptable  Hydration status: acceptable  Post anesthesia nausea and vomiting:  controlled  Final Post Anesthesia Temperature Assessment:  Normothermia (36.0-37.5 degrees C)      INITIAL Post-op Vital signs:   Vitals Value Taken Time   /109 07/11/22 1220   Temp 36.6 °C (97.8 °F) 07/11/22 1044   Pulse 90 07/11/22 1225   Resp 19 07/11/22 1225   SpO2 93 % 07/11/22 1225   Vitals shown include unvalidated device data.

## 2022-07-11 NOTE — BRIEF OP NOTE
BRIEF OPERATIVE NOTE    Date of Procedure: 7/11/2022   Preoperative Diagnosis: Osteoarthritis of glenohumeral joint, right [M19.011]  Postoperative Diagnosis: Osteoarthritis of glenohumeral joint, right [M19.011]    Procedure: Procedure(s):  RIGHT TOTAL SHOULDER ARTHROPLASTY WITH BICEPS TENODESIS AND AXILLARY NERVE DISSECTION (GENERAL WITH EXPAREL REGIONAL NERVE BLOCK)    Surgeon: Micky White MD  Assistant(s): Fabiano Quintanilla PA-C, ATC  Anesthesia: General   Estimated Blood Loss: minimal  Specimens: * No specimens in log *   Findings: OA  Complications: None  Implants:   Implant Name Type Inv.  Item Serial No.  Lot No. LRB No. Used Action   CEMENT BNE SIMPLEX W/GENT -- 10/PK - SNA  CEMENT BNE SIMPLEX W/GENT -- 10/PK NA [employer] [facility] [id number] Right 1 Implanted   GLENOID SHLDR FOSSA PROSTHESIS PREFAB 4 PEG - SNA  GLENOID SHLDR FOSSA PROSTHESIS PREFAB 4 PEG NA [employer]_ [id number] Right 1 Implanted   POST ORTH MOD TRABECULAR MTL ALLIANCE - SNA  POST ORTH MOD TRABECULAR MTL ALLIANCE NA [employer]_ [id number] Right 1 Implanted   STEM HUM FLOR MICRO 11MM -- COMPREHENSIVE - SNA  STEM HUM FLOR MICRO 11MM -- COMPREHENSIVE NA [employer]_ [id number] Right 1 Implanted   HEAD HUM BPLR [id number] -- VERSA-DIAL - SNA  HEAD HUM BPLR [id number] -- VERSA-DIAL NA [employer]_ [id number] Right 1 Implanted   HEAD HUM VERSA-DIAL STD TAPR --  - SNA  HEAD HUM VERSA-DIAL STD TAPR --  NA [employer]_ [id number] Right 1 Implanted

## 2022-07-11 NOTE — INTERVAL H&P NOTE
Date of Surgery Update:  Sarai Lynn was seen and examined. History and physical has been reviewed. The patient has been examined. There have been no significant clinical changes since the completion of the originally dated History and Physical.    Shoulder Arthroplasty   Sarai Lynn presented with advanced degenerative joint disease of the shoulder with radiographic evidence of severe joint space narrowing. Previous non-operative treatments have been tried including rest, ice, activity modifications, pain medications, and injectable treatments. The pain and impairment have progressively worsened now limiting ADLS and having a negative impact on quality of life. The risks, benefits and potential complications of the procedure have been discussed with the patient and all questions have been answered satisfactorily. The patient was counseled at length about the risks of dedra Covid-19 during their perioperative period and any recovery window from their procedure. The patient was made aware that dedra Covid-19  may worsen their prognosis for recovering from their procedure and lend to a higher morbidity and/or mortality risk. All material risks, benefits, and reasonable alternatives including postponing the procedure were discussed. The patient does  wish to proceed with the procedure at this time.       Signed By: Bhaskar Chambers MD     July 11, 2022 7:07 AM

## 2022-07-11 NOTE — ANESTHESIA PREPROCEDURE EVALUATION
Relevant Problems   No relevant active problems       Anesthetic History   No history of anesthetic complications            Review of Systems / Medical History  Patient summary reviewed and pertinent labs reviewed    Pulmonary  Within defined limits                 Neuro/Psych   Within defined limits           Cardiovascular  Within defined limits                     GI/Hepatic/Renal     GERD          Comments: Pancreatic insufficiency Endo/Other        Arthritis and cancer     Other Findings   Comments: Kidney donation         Physical Exam    Airway  Mallampati: II  TM Distance: 4 - 6 cm  Neck ROM: decreased range of motion   Mouth opening: Normal     Cardiovascular    Rhythm: regular  Rate: normal         Dental  No notable dental hx       Pulmonary  Breath sounds clear to auscultation               Abdominal  GI exam deferred       Other Findings            Anesthetic Plan    ASA: 2  Anesthesia type: general and regional - interscalene block      Post-op pain plan if not by surgeon: peripheral nerve block single      Anesthetic plan and risks discussed with: Patient and Spouse

## 2022-07-11 NOTE — OP NOTES
Date of Procedure: 7/11/2022   Preoperative Diagnosis: Osteoarthritis of glenohumeral joint, right [M19.011]  Postoperative Diagnosis: Osteoarthritis of glenohumeral joint, right [M19.011] , BICEPS TENDONITIS   Procedure: Procedure(s):  RIGHT TOTAL SHOULDER ARTHROPLASTY WITH BICEPS TENODESIS AND AXILLARY NERVE DISSECTION (GENERAL WITH EXPAREL REGIONAL NERVE BLOCK) , BICEPS TENODESIS, AXILLARY NERVE DISSECTION  Surgeon: Flor Lazar MD  Assistant(s): Brendon Sheets PA-C, ATC  was necessary for positioning, draping, retraction, closure, and brace placement. Anesthesia: General   Estimated Blood Loss: 100cc  Specimens: * No specimens in log *   Complications: None  Implants:  Implant Name Type Inv. Item Serial No.  Lot No. LRB No. Used Action   CEMENT BNE SIMPLEX W/GENT -- 10/PK - SNA  CEMENT BNE SIMPLEX W/GENT -- 10/PK NA [employer] [facility] [id number] Right 1 Implanted   GLENOID SHLDR FOSSA PROSTHESIS PREFAB 4 PEG - SNA  GLENOID SHLDR FOSSA PROSTHESIS PREFAB 4 PEG NA [employer]_ [id number] Right 1 Implanted   POST ORTH MOD TRABECULAR MTL ALLIANCE - SNA  POST ORTH MOD TRABECULAR MTL ALLIANCE NA [employer]_ [id number] Right 1 Implanted   STEM HUM FLOR MICRO 11MM -- COMPREHENSIVE - SNA  STEM HUM FLOR MICRO 11MM -- COMPREHENSIVE NA [employer]_ [id number] Right 1 Implanted   HEAD HUM BPLR [id number] -- VERSA-DIAL - SNA  HEAD HUM BPLR [id number] -- VERSA-DIAL NA [employer]_ [id number] Right 1 Implanted   HEAD HUM VERSA-DIAL STD TAPR --  - SNA  HEAD HUM VERSA-DIAL STD TAPR --  NA [employer]_ [id number] Right 1 Implanted         INDICATIONS:  The patient is a patient of mine who has had a long history of right shoulder pain. After failure of  conservative treatment, the patient presents for definitive operative care.     DESCRIPTION OF PROCEDURE:  After being informed of the risks and benefits, which include but are not limited to bleeding, infection, neurovascular damage, wound complications, pain and stiffness in the shoulder, periprosthetic loosening, fracture and dislocation the patient consented for the procedure. After adequate general anesthesia, herright shoulder was prepped and draped in a sterile fashion. A standard deltopectoral incision was then made, and the cephalic vein was identified and retracted. The subdeltoid and subcoracoid spaces were identified and retracted. The bicipital sheath was then incised, and the biceps tendon was identified and noted to be fairly frayed. We proceeded with a biceps tenodesis. We used #2FiberWire and tenodesed the tendon down to the pectoralis major tendon. The bicipital sheath was then incised and carried proximally through the rotator interval. The rotator interval was then incised to the base of the coracoid. The lesser tuberosity was then drilled, and the lesser tuberosity osteotomy was then performed using an osteotome. We externally rotated the arm, and the anterior-inferior capsule was then debrided off of the neck, allowing the humeral head to dislocate. The anatomic neck was then marked with a bovie then cut with an oscillating saw. We then serially reamed and broached the humerus. This was done to the appropriate size, obtaining excellent chatter and fit of the proximal humerus. At that point we used a calcar planer to finalize the version and the height of the humeral cut. Care was taken to be sure that the supraspinatus fibers inserted directly into the level of the resection, and no excess bone was present. The glenoid was then addressed. A laminar  was inserted, and the axillary nerve was identified as a coursed off of the posterior cord of the brachial plexus. The nerve was followed along the anterior-inferior capsule as it coursed into the posterior subdeltoid space. The nerve was then protected, and then the anterior-inferior capsule was then incised carefully.  A complete release was performed all of the way through the posterior capsule. Retractors were placed, and the labrum was excised. Care was taken to remove any glenoid osteophytes with a bur and rongeur. We marked the glenoid, and then the center of the glenoid was drilled. We serially reamed the glenoid, taking care to compensate for any amount of retroversion as identified in the preoperative CT scan. Once the reaming was complete the excess bone was then removed with a rongeur and a bur, and the peripheral peg holes were then drilled. The central large peg was then drilled as well. The glenoid was then irrigated copiously, and hemostasis was obtained with thrombin and epinephrine. The appropriately sized glenoid was cemented into position using 3rd generation cementing techniques. We used a Erendira syringe to pressurize the cement within the peg holes. All excess cement was debrided, and pressure was held on the implant until the cement was allowed to dry. We then serially trialed the humeral head. Care was taken to obtain the full 180 degrees of forward elevation, 60 degrees of internal rotation in the abducted position, 45 degrees of external rotation in the neutral position with the subscapularis reduced, and 50% subluxation posteriorly with spontaneous reduction. After this was obtained the trials were then removed. A #5 FiberWire was placed through the medial aspect of the osteotomy site, and the appropriate stem was inserted into the humerus. Care was taken to be sure that this medial suture was passed around the stem. We then repaired the lesser tuberosity osteotomy using the backpack technique. We tied the sutures over a button laterally to prevent pullout. The medial sutures were passed through the osteotendinous junction in a mattress fashion and tied down as well. The lateral rotator interval was closed with #2 FiberWire, and the wound was then irrigated copiously with antibiotic irrigation.  The deltopectoral interval was then closed. [place] Bagley PA-C  was necessary for this case. She assisted with positioning, draping, retraction, exposure, implant management, implant insertion would closure, and brace placement. The skin was closed in layers. Sterile dressings were applied and the patient was awakened, taken to the recovery room in stable condition. There were no complications.

## 2022-07-11 NOTE — DISCHARGE INSTRUCTIONS
Shoulder Surgery Discharge Instructions  Dr. Bassem Hamilton    Please take the time to review the following instructions before you leave the hospital and use them as guidelines during your recovery from surgery.  If you have any questions, you may contact my staff directly at [phone].  We are in clinic every morning and will be begin answering voicemails after 12:30pm.   If you need immediate assistance, call the main office number [phone].  The most efficient means of communication with the office is through [id number] Wound Care / Dressing Change    Two days after surgery you should remove the big, bulky white dressing. Do NOT remove the clear, plastic dressing against your skin. Keep the clear, plastic dressing intact until your follow up in the office. Showering / Bathing    If the clear plastic dressing is intact and there is no drainage, you may shower. If the dressing is loose or water gets under it please notify our office. If there is drainage, please call the office immediately. Sling    Keep your arm in the immobilizer/sling at all times except when showering, changing your clothes, and doing the exercises shown to you by Dr. Jenelle Mccullough or your physical/occupational therapist prior to your discharge from the hospital.  Keep your arm at your side when changing your clothes and showering. Do not move it away from your body. Ice and Elevation    Ice therapy should be used consistently for 48 hours after surgery. Subsequently, you should ice 3 times per day for 20 minutes at a time. After the first week, you may use ice as needed for pain and swelling. Please ensure there is protective barrier between your skin and the ice. Diet    You may advance your regular diet as tolerated. Increase your clear liquid intake for the next 2-3 days. Medications  Your prescriptions were sent to the pharmacy on file.   We are unable to change the  narcotic prescription that has already been sent today. If you would like to change your pharmacy for FUTURE prescriptions, please call the office. Pharmacy on File: [facility]      1. Pain: You were prescribed a narcotic medication for pain control. Please  use the medication as prescribed. Pain medications may cause constipation - Colace or Milk of Magnesia may be used as needed. Other possible side effects of pain medications are dizziness, headache, nausea, vomiting, and urinary retention. Discontinue the pain medication if you develop itching, rash, shortness of breath, or difficulties swallowing. If these symptoms become severe or arent relieved by discontinuing the medication, you should seek immediate medical attention. You cannot drive or operate machinery while taking narcotic medication. Some pain medications already contain Tylenol/Acetaminophen. Please read your prescription pain medicine bottle prior to taking additional Tylenol. Do not exceed 3000mg of Tylenol/Acetaminophen in a 24 hour period. Refills of pain medication are authorized during office hours only   ( Monday to Friday 8am-5pm)        2. Blood Thinner: We typically prescribe Aspirin 81mg twice daily for 14 days to prevent a blood clot. Since you only have 1 kidney, we recommend calling your primary care physician or nephrologist prior to taking Aspirin. Do not take anti-inflammatory medication (Advil, Aleve, Motrin) while taking the blood thinner. 3. Antibiotic:  You will take the prescribed antibiotic as directed. 4. Flomax: If you were prescribed tamsulosin . 4mg you should take as prescribed. This medication can lower your blood pressure while taking it. 5. Stool Softeners:  Pain medications can cause constipation. Stool softeners, warm prune juice and increasing your water and fiber intake can help prevent constipation. Do not take laxatives.        6. You should resume other medications you were taking prior to your surgery. Pain medication may change the effects of any antidepressant medication you are taking. If you have any questions about possible interactions between your regular medications and the pain medication you should consult the physician who prescribes your regular medications. Physical Therapy:  You must begin outpatient physical therapy 2-3 days after your surgery. Your were given a prescription when you scheduled your surgery. If you do not have an appointment scheduled or a therapy prescription please call Dr. Kavin Gunn' office immediately. APPOINTMENT: Violet 7-13 at 10:30am    Follow-Up Appointment:  Please follow up at your scheduled appointment 10-14 days from the day of your surgery. If you do not already have an appointment, please call our office at [phone] for your follow up appointment. Your appointment will likely be with Dagoberto Marc PA-C. [place] assists Dr. Kavin Gunn in surgery and will be able to review your operation and answer your questions. APPOINTMENT: 7-26 at 10:15am      Important Signs and Symptoms:  If any of the following signs and symptoms occurs, you should contact Dr. Kd Ca office. Please be advised if a problem arises which you feel requires immediate medical attention or you are unable to contact Dr. Kd Ca office, you should seek immediate medical attention. Signs and symptoms to watch for include:    1. A sudden increase in swelling and/or redness or warmth at the area of your surgery which isnt relieved by rest, ice, and elevation. 2. Oral temperature greater than 101degrees for 12 hours or more which isnt relieved by an increase in fluid intake and taking two Tylenol every 4-6 hours. 3. Excessive drainage from your incisions, or drainage which hasnt stopped by 72 hours after your surgery.   4. Calf pain, ever, chills, shortness of breath, chest pain, nausea, vomiting or other signs and symptoms which are of concern to you. Unless you are having a true medical emergency,   you MUST call Dr. Ian Dean' office   BEFORE proceeding to the Emergency Department. A provider is on call 24 hours/day. Exercises after Shoulder Surgery  1. Passive Forward Flexion:                             Instructions:  - Lay on your back  - Take your non surgical arm and grab the wrist of your surgical arm   - Use your non surgical arm to lift your surgical arm over your head with the elbow straight   - Slowly return your arm to your side using your non surgical arm  - Repeat 20 times in the morning and 20 times in the evening  Remember:  - Passive motion: Your arm is lifted with the help of your other hand. o The repair is protected when you do passive motion  - Active motion: You lift your arm by using your shoulder muscles. o DO NOT DO ANY ACTIVE MOTION FOR NOW    2. Codman Pendulum Exercises                                         Instructions:  - Bend forward about 90° at the waist and support yourself on a sturdy object with your non surgical arm  (bend slightly at the knees to protect your back)  - Gently allow your surgical arm to hang towards the ground  - Move your hips forward and backward allowing your arm to swing slightly  - Arm can move forward, backward, and in small circles (clockwise and counterclockwise)  o Remember: let your body move your arm, do not use your arm muscles  - Begin performing the exercise for about 30 seconds. Progress to 2 minutes.   - Repeat 2-3 times per day        DISCHARGE SUMMARY from your Nurse      PATIENT INSTRUCTIONS    After general anesthesia or intravenous sedation, for 24 hours or while taking prescription Narcotics:  · Limit your activities  · Do not drive and operate hazardous machinery  · Do not make important personal or business decisions  · Do  not drink alcoholic beverages  · If you have not urinated within 8 hours after discharge, please contact your surgeon on call. Report the following to your surgeon:  · Excessive pain, swelling, redness or odor of or around the surgical area  · Temperature over 100.5  · Nausea and vomiting lasting longer than 4 hours or if unable to take medications  · Any signs of decreased circulation or nerve impairment to extremity: change in color, persistent  numbness, tingling, coldness or increase pain  · Any questions      GOOD HELP TO FIGHT AN INFECTION  Here are a few tip to help reduce the chance of getting an infection after surgery:   Wash Your Hands   Good handwashing is the most important thing you and your caregiver can do.  Wash before and after caring for any wounds. Dry your hand with a clean towel.  Wash with soap and water for at least 20 seconds. A TIP: sing the \"Happy Birthday\" song through one time while washing to help with the timing.  Use a hand  in between washings.  Shower   When your surgeon says it is OK to take a shower, use a new bar of antibacterial soap (if that is what you use, and keep that bar of soap ONLY for your use), or antibacterial body wash.  Use a clean wash cloth or sponge when you bathe.  Dry off with a clean towel  after every bath - be careful around any wounds, skin staples, sutures or surgical glue over/on wounds.  Do not enter swimming pools, hot tubs, lakes, rivers and/or ocean until wounds are healed and your doctor/surgeon says it is OK.  Use Clean Sheets   Sleep on freshly laundered sheets after your surgery.  Keep the surgery site covered with a clean, dry bandage (if instructed to do so). If the bandage becomes soiled, reapply a new, dry, clean bandage.  Do not allow pets to sleep with you while your wound is healing.  Lifestyle Modification and Controlling Your Blood Sugar   Smoking slows wound healing. Stop smoking and limit exposure to second-hand smoke.  High blood sugar slows wound healing. Eat a well-balanced diet to provide proper nutrition while healing   Monitor your blood sugar (if you are a diabetic) and take your medications as you are suppose to so you can control you blood sugar after surgery. COUGH AND DEEP BREATHE    Breathing deeply and coughing are very important exercises to do after surgery. Deep breathing and coughing open the little air tubes and air sacks in your lungs. You take deep breaths every day. You may not even notice - it is just something you do when you sigh or yawn. It is a natural exercise you do to keep these air passages open. After surgery, take deep breaths and cough, on purpose. DIRECTIONS:  · Take 10 to 15 slow deep breaths every hour while awake. · Breathe in deeply, and hold it for 2 seconds. · Exhale slowly through puckered lips, like blowing up a balloon. · After every 4th or 5th deep breath, hug your pillow to your chest or belly and give a hard, deep cough. Yes, it will probably hurt. But doing this exercise is a very important part of healing after surgery. Take your pain medicine to help you do this exercise without too much pain. Coughing and deep breathing help prevent bronchitis and pneumonia after surgery. If you had chest or belly surgery, use a pillow as a \"hug mya\" and hold it tightly to your chest or belly when you cough. ANKLE PUMPS    Ankle pumps increase the circulation of oxygenated blood to your lower extremities and decrease your risk for circulation problems such as blood clots. They also stretch the muscles, tendons and ligaments in your foot and ankle, and prevent joint contracture in the ankle and foot, especially after surgeries on the legs. It is important to do ankle pump exercises regularly after surgery because immobility increases your risk for developing a blood clot. Your doctor may also have you take an Aspirin for the next few days as well.       If your doctor did not ask you to take an Aspirin, consult with him before starting Aspirin therapy on your own. The exercise is quite simple. · Slowly point your foot forward, feeling the muscles on the top of your lower leg stretch, and hold this position for 5 seconds. · Next, pull your foot back toward you as far as possible, stretching the calf muscles, and hold that position for 5 seconds. · Repeat with the other foot. · Perform 10 repetitions every hour while awake for both ankles if possible (down and then up with the foot once is one repetition). You should feel gentle stretching of the muscles in your lower leg when doing this exercise. If you feel pain, or your range of motion is limited, don't push too hard. Only go the limit your joint and muscles will let you go. If you have increasing pain, progressively worsening leg warmth or swelling, STOP the exercise and call your doctor. MEDICATION AND   SIDE EFFECT GUIDE    The [facility] Insurance MEDICATION AND SIDE EFFECT GUIDE was provided to the PATIENT AND CARE PROVIDER. Information provided includes instruction about drug purpose and common side effects for the following medications:   · Keflex  · norco  ·         These are general instructions for a healthy lifestyle:    *   Please give a list of your current medications to your Primary Care Provider. *   Please update this list whenever your medications are discontinued, doses are changed, or new medications (including over-the-counter products) are added. *   Please carry medication information at all times in case of emergency situations. About Smoking  No smoking / No tobacco products  Avoid exposure to second hand smoke     Surgeon General's Warning:  Quitting smoking now greatly reduces serious risk to your health. Obesity, smoking, and sedentary lifestyle greatly increases your risk for illness and disease.   A healthy diet, regular physical exercise & weight monitoring are important for maintaining a healthy lifestyle. Congestive Heart Failure  You may be retaining fluid if you have a history of heart failure or if you experience any of the following symptoms:  Weight gain of 3 pounds or more overnight or 5 pounds in a week, increased swelling in your hands or feet or shortness of breath while lying flat in bed. Please call your doctor as soon as you notice any of these symptoms; do not wait until your next office visit. Recognize signs and symptoms of STROKE:  F -  Face looks uneven  A -  Arms unable to move or move evenly  S -  Speech slurred or non-existent  T -  Time-call 911 as soon as signs and symptoms begin-DO NOT go          back to bed or wait to see if you get better-TIME IS BRAIN. Warning Signs of HEART ATTACK   Call 911 if you have these symptoms:     Chest discomfort. Most heart attacks involve discomfort in the center of the chest that lasts more than a few minutes, or that goes away and comes back. It can feel like uncomfortable pressure, squeezing, fullness, or pain.  Discomfort in other areas of the upper body. Symptoms can include pain or discomfort in one or both arms, the back, neck, jaw, or stomach.  Shortness of breath with or without chest discomfort.  Other signs may include breaking out in a cold sweat, nausea, or lightheadedness. Don't wait more than five minutes to call 911 - MINUTES MATTER! Fast action can save your life. Calling 911 is almost always the fastest way to get lifesaving treatment. Emergency Medical Services staff can begin treatment when they arrive -- up to an hour sooner than if someone gets to the hospital by car. Learning About Coronavirus [phone])  Coronavirus [phone]): Overview  What is coronavirus (COVID-19)? The coronavirus disease (COVID-19) is caused by a virus. It is an illness that was first found in [place], [place], in December 2019. It has since spread worldwide.   The virus can cause fever, cough, and trouble breathing. In severe cases, it can cause pneumonia and make it hard to breathe without help. It can cause death. Coronaviruses are a large group of viruses. They cause the common cold. They also cause more serious illnesses like Middle East respiratory syndrome (MERS) and severe acute respiratory syndrome (SARS). COVID-19 is caused by a novel coronavirus. That means it's a new type that has not been seen in people before. This virus spreads person-to-person through droplets from coughing and sneezing. It can also spread when you are close to someone who is infected. And it can spread when you touch something that has the virus on it, such as a doorknob or a tabletop. What can you do to protect yourself from coronavirus (COVID-19)? The best way to protect yourself from getting sick is to:  · Avoid areas where there is an outbreak. · Avoid contact with people who may be infected. · Wash your hands often with soap or alcohol-based hand sanitizers. · Avoid crowds and try to stay at least 6 feet away from other people. · Wash your hands often, especially after you cough or sneeze. Use soap and water, and scrub for at least 20 seconds. If soap and water aren't available, use an alcohol-based hand . · Avoid touching your mouth, nose, and eyes. What can you do to avoid spreading the virus to others? To help avoid spreading the virus to others:  · Cover your mouth with a tissue when you cough or sneeze. Then throw the tissue in the trash. · Use a disinfectant to clean things that you touch often. · Stay home if you are sick or have been exposed to the virus. Don't go to school, work, or public areas. And don't use public transportation. · If you are sick:  ? Leave your home only if you need to get medical care. But call the doctor's office first so they know you're coming. And wear a face mask, if you have one.  ? If you have a face mask, wear it whenever you're around other people. It can help stop the spread of the virus when you cough or sneeze. ? Clean and disinfect your home every day. Use household  and disinfectant wipes or sprays. Take special care to clean things that you grab with your hands. These include doorknobs, remote controls, phones, and handles on your refrigerator and microwave. And don't forget countertops, tabletops, bathrooms, and computer keyboards. When to call for help  Call 911 anytime you think you may need emergency care. For example, call if:  · You have severe trouble breathing. (You can't talk at all.)  · You have constant chest pain or pressure. · You are severely dizzy or lightheaded. · You are confused or can't think clearly. · Your face and lips have a blue color. · You pass out (lose consciousness) or are very hard to wake up. Call your doctor now if you develop symptoms such as:  · Shortness of breath. · Fever. · Cough. If you need to get care, call ahead to the doctor's office for instructions before you go. Make sure you wear a face mask, if you have one, to prevent exposing other people to the virus. Where can you get the latest information? The following health organizations are tracking and studying this virus. Their websites contain the most up-to-date information. Nyla Wade also learn what to do if you think you may have been exposed to the virus. · U.S. Centers for Disease Control and Prevention (CDC): The CDC provides updated news about the disease and travel advice. The website also tells you how to prevent the spread of infection. www.cdc.gov  · World Health Organization [facility]): WHO offers information about the virus outbreaks. WHO also has travel advice. www.who.int  Current as of: April 1, 2020               Content Version: 12.4  © 3489-1030 [employer].    Care instructions adapted under license by your healthcare professional. If you have questions about a medical condition or this instruction, always ask your healthcare professional. [place] [address] any warranty or liability for your use of this information. The discharge information has been reviewed with the {PATIENT PARENT GUARDIAN:38999}. Any questions and concerns from the {PATIENT PARENT GUARDIAN:94539} have been addressed. The {PATIENT PARENT GUARDIAN:29214} verbalized understanding. CONTENTS FOUND IN YOUR DISCHARGE ENVELOPE:  [x]     Surgeon and Hospital Discharge Instructions  [x]     [facility] Care Provider Card  []     Medication & Side Effect Guide            (your newly prescribed medications have been marked/highlighted showing the most common side effects from   the classes of drugs on your prescriptions)  []     Medication Prescription(s) x *** ( [] These have been sent electronically to your pharmacy by your surgeon,   - OR -       your surgeon has already provided these to you during a previous/pre-op office visit)  []     [address]  []     Physical Therapy Prescription  []     Follow-up Appointment Cards  []     Surgery-related Pictures/Media  []     Pain block and/or block with On-Q Catheter from Anesthesia Service (information included in your instructions above)  []     Medical device information sheets/pamphlets from their    []     School/work excuse note. []     /parent work excuse note. The following personal items collected during your admission are returned to you:   Dental Appliance: Dental Appliances: None  Vision: Visual Aid: Glasses (PACU desk)  Hearing Aid: Hearing Aid: Bilateral (PACU desk)  Jewelry: Jewelry: None  Clothing: Clothing: Shirt,Shorts,Footwear,Undergarments  Other Valuables: Other Valuables: Eyeglasses,Brace,Other (comment) (hearing aids)  Valuables sent to safe:         Controlling Post-Operative Pain with EXPAREL®   (Bupivacaine Liposome Injectable Suspension)    Pain control after surgery is important for your recovery.   Your surgeon may use different medications to help control your pain, and other techniques such as rest, ice, and elevation may also be helpful. YOUR SURGEON GAVE YOU EXPAREL     EXPAREL is a local analgesic (a kind of numbing medicine) to help control pain after surgery. This medicine was injected in/around your incision before the surgery was over. Sometimes, the anesthesia team will use EXPAREL (if you received a regional anesthesia technique) to help control pain after surgery. Some things you may want to know:     EXPAREL contains the medication bupivacaine. it numbs the tissues and nerves around the surgery site.  EXPAREL is specially designed to release the numbing medication slowly over time. In making the EXPAREL release slowly over time, it may help reduce the number of other medications like narcotics you may need.  In addition to [address], your doctor may provide you other pain medications to control you pain.  Each patient is different and may respond to pain and pain medications differently. Depending on how you respond to EXPAREL, you may require less additional pain medication during your recovery. YOUR RECOVERY    When your pain is under control, your body can focus better on healing. This is not the time to test your pain tolerance, to tough it out or grin and bear it.   If you feel your pain is not well controlled speak with your surgeon to discuss your pain.  Follow your discharge instructions.  Eat a healthy diet and drink plenty of water. Surgery stresses your body; your body responds by needing more energy to heal.    IMPORTANT PATIENT INFORMATION    Products that contain bupivacaine, like EXPAREL, may cause a temporary loss of sensation or the ability to move the area where the medicine was injected - this is normal and often the desired outcome of the injection, to help with pain control. However, the numbness should not last forever.   Follow your post op instructions carefully. Tell your doctor if you are pregnant, think you are pregnant, or are considering breastfeeding; certain obstetrical and gynecological procedures should not use EXPAREL. Alysa Rushing has not been studied in patients younger than 18 years. Side effects can occur with any medication, and it is important not to ignore anything you may be experiencing. Some patients who received EXPAREL experienced:            Nausea              Vomiting             Constipation    Rarely, patients who receive bupivacaine (the active ingredient in EXPAREL) have experienced numbness and tingling in their mouth or lips, lightheadedness, anxiety, or abnormal heart rate. Speak with your doctor right away if you think you may be experiencing any of these sensations, or if you have other questions regarding the possible side effects. Tell your doctor if you have severe liver or kidney disease. Visit [employer] or call 0-695-QO-EXPAREL for more information.     DATE GIVEN 07/11     TIME GIVEN 0757     DOSE GIVEN 10ml  Other formulations of bupivacaine should not be administered within 96 hours following administration of EXPAREL     Modified from patient education handout, [address] [address] [place] Ernesto Coker Merissasurekhafrances [address] form [id number]; 17 March 2021

## 2022-07-11 NOTE — ANESTHESIA PROCEDURE NOTES
Peripheral Block    Start time: 7/11/2022 7:51 AM  End time: 7/11/2022 7:57 AM  Performed by: Bruce Dowd CRNA  Authorized by: Robbie Broderick MD       Pre-procedure: Indications: at surgeon's request and post-op pain management    Preanesthetic Checklist: patient identified, risks and benefits discussed, site marked, timeout performed, anesthesia consent given, patient being monitored and fire risk safety assessment completed and verbalized    Timeout Time: 07:51 EDT          Block Type:   Block Type:   Interscalene  Laterality:  Right  Monitoring:  Continuous pulse ox, frequent vital sign checks, heart rate, responsive to questions and oxygen  Injection Technique:  Single shot  Procedures: ultrasound guided and nerve stimulator    Patient Position: supine  Prep: chlorhexidine    Location:  Interscalene  Needle Type:  Stimuplex  Needle Gauge:  22 G  Needle Localization:  Nerve stimulator and ultrasound guidance  Medication Injected:  Bupivacaine liposome (PF) susp (EXPAREL) infiltration, 10 mL  Med Admin Time: 7/11/2022 7:57 AM    Assessment:  Number of attempts:  1  Injection Assessment:  Incremental injection every 5 mL, local visualized surrounding nerve on ultrasound, negative aspiration for blood, no paresthesia and no intravascular symptoms  Patient tolerance:  Patient tolerated the procedure well with no immediate complications

## 2022-10-25 ENCOUNTER — TRANSCRIBE ORDER (OUTPATIENT)
Dept: SCHEDULING | Age: 77
End: 2022-10-25

## 2022-10-25 DIAGNOSIS — M19.012 OSTEOARTHRITIS OF LEFT GLENOHUMERAL JOINT: Primary | ICD-10-CM

## 2022-11-01 ENCOUNTER — HOSPITAL ENCOUNTER (OUTPATIENT)
Dept: CT IMAGING | Age: 77
Discharge: HOME | End: 2022-11-01
Attending: PHYSICIAN ASSISTANT
Payer: MEDICARE

## 2022-11-01 DIAGNOSIS — M19.012 OSTEOARTHRITIS OF LEFT GLENOHUMERAL JOINT: ICD-10-CM

## 2022-11-01 PROCEDURE — 73200 CT UPPER EXTREMITY W/O DYE: CPT

## 2022-12-15 ENCOUNTER — ANESTHESIA (OUTPATIENT)
Dept: ENDOSCOPY | Age: 77
End: 2022-12-15
Payer: MEDICARE

## 2022-12-15 ENCOUNTER — HOSPITAL ENCOUNTER (OUTPATIENT)
Age: 77
Setting detail: OUTPATIENT SURGERY
Discharge: HOME | End: 2022-12-15
Attending: INTERNAL MEDICINE | Admitting: INTERNAL MEDICINE
Payer: MEDICARE

## 2022-12-15 ENCOUNTER — ANESTHESIA EVENT (OUTPATIENT)
Dept: ENDOSCOPY | Age: 77
End: 2022-12-15
Payer: MEDICARE

## 2022-12-15 VITALS
BODY MASS INDEX: 29.84 KG/M2 | OXYGEN SATURATION: 100 % | DIASTOLIC BLOOD PRESSURE: 55 MMHG | HEART RATE: 67 BPM | TEMPERATURE: 98.1 F | RESPIRATION RATE: 16 BRPM | WEIGHT: 168.43 LBS | HEIGHT: 63 IN | SYSTOLIC BLOOD PRESSURE: 122 MMHG

## 2022-12-15 PROCEDURE — 76040000019: Performed by: INTERNAL MEDICINE

## 2022-12-15 PROCEDURE — 76060000031 HC ANESTHESIA FIRST 0.5 HR: Performed by: INTERNAL MEDICINE

## 2022-12-15 PROCEDURE — 74011250636 HC RX REV CODE- 250/636: Performed by: NURSE ANESTHETIST, CERTIFIED REGISTERED

## 2022-12-15 PROCEDURE — 88305 TISSUE EXAM BY PATHOLOGIST: CPT

## 2022-12-15 PROCEDURE — 2709999900 HC NON-CHARGEABLE SUPPLY: Performed by: INTERNAL MEDICINE

## 2022-12-15 PROCEDURE — 77030013992 HC SNR POLYP ENDOSC BSC -B: Performed by: INTERNAL MEDICINE

## 2022-12-15 RX ORDER — DEXTROMETHORPHAN/PSEUDOEPHED 2.5-7.5/.8
1.2 DROPS ORAL
Status: DISCONTINUED | OUTPATIENT
Start: 2022-12-15 | End: 2022-12-15 | Stop reason: HOSPADM

## 2022-12-15 RX ORDER — EPINEPHRINE 0.1 MG/ML
1 INJECTION INTRACARDIAC; INTRAVENOUS
Status: DISCONTINUED | OUTPATIENT
Start: 2022-12-15 | End: 2022-12-15 | Stop reason: HOSPADM

## 2022-12-15 RX ORDER — MIDAZOLAM HYDROCHLORIDE 1 MG/ML
.25-5 INJECTION, SOLUTION INTRAMUSCULAR; INTRAVENOUS
Status: DISCONTINUED | OUTPATIENT
Start: 2022-12-15 | End: 2022-12-15 | Stop reason: HOSPADM

## 2022-12-15 RX ORDER — ATROPINE SULFATE 0.1 MG/ML
0.4 INJECTION INTRAVENOUS
Status: DISCONTINUED | OUTPATIENT
Start: 2022-12-15 | End: 2022-12-15 | Stop reason: HOSPADM

## 2022-12-15 RX ORDER — SODIUM CHLORIDE 9 MG/ML
50 INJECTION, SOLUTION INTRAVENOUS CONTINUOUS
Status: DISCONTINUED | OUTPATIENT
Start: 2022-12-15 | End: 2022-12-15 | Stop reason: HOSPADM

## 2022-12-15 RX ORDER — PROPOFOL 10 MG/ML
INJECTION, EMULSION INTRAVENOUS AS NEEDED
Status: DISCONTINUED | OUTPATIENT
Start: 2022-12-15 | End: 2022-12-15 | Stop reason: HOSPADM

## 2022-12-15 RX ORDER — PROPOFOL 10 MG/ML
INJECTION, EMULSION INTRAVENOUS
Status: DISCONTINUED | OUTPATIENT
Start: 2022-12-15 | End: 2022-12-15 | Stop reason: HOSPADM

## 2022-12-15 RX ORDER — NALOXONE HYDROCHLORIDE 0.4 MG/ML
0.4 INJECTION, SOLUTION INTRAMUSCULAR; INTRAVENOUS; SUBCUTANEOUS
Status: DISCONTINUED | OUTPATIENT
Start: 2022-12-15 | End: 2022-12-15 | Stop reason: HOSPADM

## 2022-12-15 RX ORDER — FLUMAZENIL 0.1 MG/ML
0.2 INJECTION INTRAVENOUS
Status: DISCONTINUED | OUTPATIENT
Start: 2022-12-15 | End: 2022-12-15 | Stop reason: HOSPADM

## 2022-12-15 RX ADMIN — PROPOFOL 20 MG: 10 INJECTION, EMULSION INTRAVENOUS at 08:31

## 2022-12-15 RX ADMIN — PROPOFOL 80 MG: 10 INJECTION, EMULSION INTRAVENOUS at 08:30

## 2022-12-15 RX ADMIN — PROPOFOL 30 MG: 10 INJECTION, EMULSION INTRAVENOUS at 08:39

## 2022-12-15 RX ADMIN — PROPOFOL 100 MCG/KG/MIN: 10 INJECTION, EMULSION INTRAVENOUS at 08:30

## 2022-12-15 RX ADMIN — PROPOFOL 50 MG: 10 INJECTION, EMULSION INTRAVENOUS at 08:34

## 2022-12-15 NOTE — PROGRESS NOTES
Solomon Petit  1945  [id number]    Situation:  Verbal report received from:   Connie Villeda RN   Procedure: Procedure(s):  COLONOSCOPY  ENDOSCOPIC POLYPECTOMY    Background:    Preoperative diagnosis: ACUTE DIARRHEA  Postoperative diagnosis: Diverticulosis  Colon Polyp x2    :  Dr. Mendy Erazo   Assistant(s): Endoscopy RN-1: Jabier Bonilla RN  Endoscopy RN-2: Estephania Dejesus RN    Specimens:   ID Type Source Tests Collected by Time Destination   1 : Sigmoid Polyp Preservative   Viridiana Garza MD 12/15/2022 2274 Pathology   2 : Ascending Polyp Preservative   Viridiana Garza MD 12/15/2022 2923 Pathology     H. Pylori  no    Assessment:  Intra-procedure medications     Anesthesia gave intra-procedure sedation and medications, see anesthesia flow sheet yes    Intravenous fluids: NS@ KVO     Vital signs stable   yes     Abdominal assessment: round and soft   yes    Recommendation:  Discharge patient per MD order  yes.   Return to floor  outpatient  Family or Friend   spouse  Permission to share finding with family or friend yes

## 2022-12-15 NOTE — DISCHARGE INSTRUCTIONS
[address] [facility]. Ethan Parmar M.D.  [phone]            COLON DISCHARGE INSTRUCTIONS       12/15/2022    Yaw Archibald  :  1945  Janny Medical Record Number:  [id number]      COLONOSCOPY FINDINGS:  Your colonoscopy showed two polyps that were removed and sent to pathology, diverticulosis and small internal hemorrhoids. DISCOMFORT:  Redness at IV site- apply warm compress to area; if redness or soreness persist- contact your physician  There may be a slight amount of blood passed from the rectum  Gaseous discomfort- walking, belching will help relieve any discomfort  You may not operate a vehicle for 12 hours  You may not engage in an occupation involving machinery or appliances for rest of today  You may not drink alcoholic beverages for at least 12 hours  Avoid making any critical decisions for at least 24 hour  DIET:   High fiber diet. - however -  remember your colon is empty and a heavy meal will produce gas. Avoid these foods:  vegetables, fried / greasy foods, carbonated drinks for today     ACTIVITY:  You may resume your normal daily activities it is recommended that you spend the remainder of the day resting -  avoid any strenuous activity. CALL M.D. ANY SIGN OF:   Increasing pain, nausea, vomiting  Abdominal distension (swelling)  New increased bleeding (oral or rectal)  Fever (chills)  Pain in chest area  Bloody discharge from nose or mouth   Shortness of breath    Follow-up Instructions:   Call Dr. Andrea Estrada if any questions or problems. Telephone # [phone]  Biopsy results will be available in  5 to 7 days  Repeat colonoscopy in 3 years if in good health and willing to undergo colonoscopy then.

## 2022-12-15 NOTE — PROCEDURES
Graham Lawrence M.D.  [phone]            12/15/2022          Colonoscopy Operative Report  Zuly Butterfield  :  1945  Janny Medical Record Number:  [id number]      Indications:    Personal history of colon polyps (screening only)     :  Macey Landa MD    Referring Provider: Cherry Knutson MD    Sedation:  MAC anesthesia    Pre-Procedural Exam:      Airway: clear,  No airway problems anticipated  Heart: RRR, without gallops or rubs  Lungs: clear bilaterally without wheezes, crackles, or rhonchi  Abdomen: soft, nontender, nondistended, bowel sounds present  Mental Status: awake, alert and oriented to person, place and time     Procedure Details:  After informed consent was obtained with all risks and benefits of procedure explained and preoperative exam completed, the patient was taken to the endoscopy suite and placed in the left lateral decubitus position. Upon sequential sedation as per above, a digital rectal exam was performed. The Olympus videocolonoscope  was inserted in the rectum and carefully advanced to the cecum, which was identified by the ileocecal valve and appendiceal orifice. The quality of preparation was good. The colonoscope was slowly withdrawn with careful inspection and evaluation between folds. Retroflexion in the rectum was performed. Findings:   Terminal Ileum: not intubated  Cecum: normal  Ascending Colon: 1  Sessile polyp(s), the largest 4 mm in size;  Transverse Colon: normal  Descending Colon: normal  Sigmoid: 1  Pedunculated polyp(s), the largest 4 mm in size  mild diverticulosis; Rectum: no mucosal lesion appreciated  Grade 1 internal hemorrhoid(s);     Interventions:  2 complete polypectomy were performed using cold snare  and the polyps were  retrieved    Specimen Removed:  specimen #1, 4 mm in size, located in the sigmoid removed by cold snare and retrieved for pathology  #2, 4 mm in size, located in the ascending colon removed by cold snare and retrieved for pathology    Complications: None. EBL:  None. Impression:  Two small polyps removed and sent to pathology, otherwise mucosa within normal.                         Sigmoid diverticulosis and small internal hemorrhoids    Recommendations:  -If adenoma is present, repeat colonoscopy in 3 years if in good health and willing to undergo colonoscopy. -High fiber diet.    -Resume normal medication(s). Discharge Disposition:  Home in the company of a  when able to ambulate.     Eric Gutierrez MD  12/15/2022  8:47 AM

## 2022-12-15 NOTE — PROGRESS NOTES
Endoscopy discharge instructions have been reviewed and given to patient. The patient verbalized understanding and acceptance of instructions. Dr. Figueroa Guzman  discussed with patient procedure findings and next steps.

## 2022-12-15 NOTE — ANESTHESIA POSTPROCEDURE EVALUATION
Procedure(s):  COLONOSCOPY  ENDOSCOPIC POLYPECTOMY. MAC    Anesthesia Post Evaluation      Multimodal analgesia: multimodal analgesia not used between 6 hours prior to anesthesia start to PACU discharge  Patient location during evaluation: PACU  Patient participation: complete - patient participated  Level of consciousness: awake and alert  Pain score: 0  Pain management: adequate  Airway patency: patent  Anesthetic complications: no  Cardiovascular status: hemodynamically stable and acceptable  Respiratory status: acceptable  Hydration status: acceptable  Comments: Patient seen and evaluated; no concerns. Post anesthesia nausea and vomiting:  none      INITIAL Post-op Vital signs:   Vitals Value Taken Time   /55 12/15/22 0911   Temp 36.7 °C (98.1 °F) 12/15/22 0854   Pulse 69 12/15/22 0913   Resp 13 12/15/22 0913   SpO2 98 % 12/15/22 0913   Vitals shown include unvalidated device data.

## 2022-12-15 NOTE — H&P
Yun Bryant M.D.  [phone]            History and Physical       NAME:  Wynelle Bumpers   :   1945   MRN:   [id number]       Referring Physician:  Dr. Luis Garcia Date: 12/15/2022 8:29 AM    Chief Complaint:  Colon cancer screening and colon polyp surveillance    History of Present Illness:  Patient is a 69 y.o. who is seen for colon cancer screening and colon polyp surveillance. Denies any ongoing GI complaints. PMH:  Past Medical History:   Diagnosis Date    Endometrial cancer ([address]     Exocrine pancreatic insufficiency     GERD (gastroesophageal reflux disease)     resolved, off meds    H/O kidney donation        PSH:  Past Surgical History:   Procedure Laterality Date    COLONOSCOPY N/A 2019    COLONOSCOPY performed by Vi Arias MD at [address] [place] [address] Bilateral     HX KNEE ARTHROSCOPY Bilateral     HX OPEN CHOLECYSTECTOMY      HX ORTHOPAEDIC Left     RING FINGER AND PINKY TRIGGER RELEASE    HX ORTHOPAEDIC Right     shoulder replacement    HX OTHER SURGICAL Right     kidney donation    HX TOTAL ABDOMINAL HYSTERECTOMY         Allergies: Allergies   Allergen Reactions    Demerol [Meperidine] Nausea and Vomiting       Home Medications:  Prior to Admission Medications   Prescriptions Last Dose Informant Patient Reported? Taking?   ascorbic acid, vitamin C, (VITAMIN C) 500 mg tablet 2022  Yes Yes   Sig: Take 500 mg by mouth Every morning. cholecalciferol, vitamin D3, 50 mcg (2,000 unit) tab 2022  Yes Yes   Sig: Take 1 Tablet by mouth Every morning. Indications: prevention of vitamin D deficiency   ergocalciferol (ERGOCALCIFEROL) 1,250 mcg (50,000 unit) capsule 2022  Yes No   Sig: Take 50,000 Units by mouth every seven (7) days. lipase/protease/amylase (ZENPEP PO) 2022  Yes No   Sig: Take 2 Caplet by mouth three (3) times daily (with meals).    lipase/protease/amylase (ZENPEP PO) 2022  Yes No   Sig: Take 1 Caplet by mouth. WITH EVERY SNACK   red yeast rice 600 mg tab 2022  Yes No   Sig: Take 2 Caplet by mouth two (2) times a day. ubidecarenone/vitamin E mixed (COQ10  PO) 2022  Yes No   Sig: Take 1 Tablet by mouth two (2) times a day. Facility-Administered Medications: None       Hospital Medications:  Current Facility-Administered Medications   Medication Dose Route Frequency    0.9% sodium chloride infusion  50 mL/hr IntraVENous CONTINUOUS    midazolam (VERSED) injection 0.25-5 mg  0.25-5 mg IntraVENous Multiple    naloxone (NARCAN) injection 0.4 mg  0.4 mg IntraVENous Multiple    flumazeniL (ROMAZICON) 0.1 mg/mL injection 0.2 mg  0.2 mg IntraVENous Multiple    simethicone (MYLICON) 56CI/9.5WV oral drops 80 mg  1.2 mL Oral Multiple    atropine injection 0.4 mg  0.4 mg IntraVENous ONCE PRN    EPINEPHrine (ADRENALIN) 0.1 mg/mL syringe 1 mg  1 mg Endoscopically ONCE PRN       Social History:  Social History     Tobacco Use    Smoking status: Former     Packs/day: 0.25     Years: 10.00     Pack years: 2.50     Types: Cigarettes     Quit date:      Years since quittin.9    Smokeless tobacco: Never   Substance Use Topics    Alcohol use:  Yes     Alcohol/week: 2.0 standard drinks     Types: 2 Glasses of wine per week       Family History:  Family History   Problem Relation Age of Onset    Cancer Father 79        colon/liver    Cancer Brother 79        colon    Breast Cancer Maternal Grandmother     Breast Cancer Maternal Aunt              Review of Systems:      Constitutional: negative fever, negative chills, negative weight loss  Eyes:   negative visual changes  ENT:   negative sore throat, tongue or lip swelling  Respiratory:  negative cough, negative dyspnea  Cards:  negative for chest pain, palpitations, lower extremity edema  GI:   See HPI  :  negative for frequency, dysuria  Integument:  negative for rash and pruritus  Heme:  negative for easy bruising and gum/nose bleeding  Musculoskel: negative for myalgias,  back pain and muscle weakness  Neuro: negative for headaches, dizziness, vertigo  Psych:  negative for feelings of anxiety, depression       Objective:   Patient Vitals for the past 8 hrs:   BP Temp Pulse Resp SpO2 Height Weight   12/15/22 0729 (!) 124/46 98.7 °F (37.1 °C) 78 15 97 % 5' 3\" (1.6 m) 76.4 kg (168 lb 6.9 oz)     No intake/output data recorded. No intake/output data recorded. EXAM:     NEURO-a&o   HEENT-wnl   LUNGS-clear    COR-regular rate and rhythym     ABD-soft , no tenderness, no rebound, good bs     EXT-no edema     Data Review     No results for input(s): WBC, HGB, HCT, PLT, HGBEXT, HCTEXT, PLTEXT in the last 72 hours. No results for input(s): NA, K, CL, CO2, BUN, CREA, GLU, PHOS, CA in the last 72 hours. No results for input(s): AP, TBIL, TP, ALB, GLOB, GGT, AML, LPSE in the last 72 hours. No lab exists for component: SGOT, GPT, AMYP, HLPSE  No results for input(s): INR, PTP, APTT, INREXT in the last 72 hours. There is no problem list on file for this patient. Assessment:     Colon cancer screening   Plan:     Colonoscopy today.      Signed By: Ciaran Stone MD     12/15/2022  8:29 AM

## 2022-12-15 NOTE — ANESTHESIA PREPROCEDURE EVALUATION
Relevant Problems   No relevant active problems       Anesthetic History   No history of anesthetic complications            Review of Systems / Medical History  Patient summary reviewed and nursing notes reviewed    Pulmonary  Within defined limits                 Neuro/Psych   Within defined limits           Cardiovascular  Within defined limits                Exercise tolerance: >4 METS     GI/Hepatic/Renal               Comments: Pancreatic insufficiency Endo/Other  Within defined limits           Other Findings              Physical Exam    Airway  Mallampati: II    Neck ROM: normal range of motion   Mouth opening: Normal     Cardiovascular    Rhythm: regular  Rate: normal         Dental  No notable dental hx       Pulmonary  Breath sounds clear to auscultation               Abdominal         Other Findings            Anesthetic Plan    ASA: 2  Anesthesia type: MAC            Anesthetic plan and risks discussed with: Patient      Informed consent obtained.

## 2022-12-28 ENCOUNTER — HOSPITAL ENCOUNTER (OUTPATIENT)
Dept: PREADMISSION TESTING | Age: 77
Discharge: HOME | End: 2022-12-28
Payer: MEDICARE

## 2022-12-28 VITALS
WEIGHT: 171.52 LBS | SYSTOLIC BLOOD PRESSURE: 149 MMHG | OXYGEN SATURATION: 96 % | TEMPERATURE: 97.1 F | HEART RATE: 81 BPM | DIASTOLIC BLOOD PRESSURE: 72 MMHG | HEIGHT: 63 IN | RESPIRATION RATE: 16 BRPM | BODY MASS INDEX: 30.39 KG/M2

## 2022-12-28 LAB
ABO + RH BLD: NORMAL
ALBUMIN SERPL-MCNC: 3.6 G/DL (ref 3.5–5)
ALBUMIN/GLOB SERPL: 1.1 {RATIO} (ref 1.1–2.2)
ALP SERPL-CCNC: 78 U/L (ref 45–117)
ALT SERPL-CCNC: 25 U/L (ref 12–78)
ANION GAP SERPL CALC-SCNC: 7 MMOL/L (ref 5–15)
APPEARANCE UR: ABNORMAL
APTT PPP: 26.1 SEC (ref 22.1–31)
AST SERPL-CCNC: 17 U/L (ref 15–37)
BACTERIA URNS QL MICRO: ABNORMAL /HPF
BASOPHILS # BLD: 0.1 K/UL (ref 0–0.1)
BASOPHILS NFR BLD: 1 % (ref 0–1)
BILIRUB SERPL-MCNC: 0.4 MG/DL (ref 0.2–1)
BILIRUB UR QL: NEGATIVE
BLOOD GROUP ANTIBODIES SERPL: NORMAL
BUN SERPL-MCNC: 18 MG/DL (ref 6–20)
BUN/CREAT SERPL: 31 (ref 12–20)
CALCIUM SERPL-MCNC: 9.5 MG/DL (ref 8.5–10.1)
CAOX CRY URNS QL MICRO: ABNORMAL
CHLORIDE SERPL-SCNC: 109 MMOL/L (ref 97–108)
CO2 SERPL-SCNC: 28 MMOL/L (ref 21–32)
COLOR UR: ABNORMAL
CREAT SERPL-MCNC: 0.59 MG/DL (ref 0.55–1.02)
DIFFERENTIAL METHOD BLD: NORMAL
EOSINOPHIL # BLD: 0.2 K/UL (ref 0–0.4)
EOSINOPHIL NFR BLD: 4 % (ref 0–7)
EPITH CASTS URNS QL MICRO: ABNORMAL /LPF
ERYTHROCYTE [DISTWIDTH] IN BLOOD BY AUTOMATED COUNT: 14.4 % (ref 11.5–14.5)
EST. AVERAGE GLUCOSE BLD GHB EST-MCNC: 111 MG/DL
GLOBULIN SER CALC-MCNC: 3.3 G/DL (ref 2–4)
GLUCOSE SERPL-MCNC: 99 MG/DL (ref 65–100)
GLUCOSE UR STRIP.AUTO-MCNC: NEGATIVE MG/DL
HBA1C MFR BLD: 5.5 % (ref 4–5.6)
HCT VFR BLD AUTO: 43 % (ref 35–47)
HGB BLD-MCNC: 13.5 G/DL (ref 11.5–16)
HGB UR QL STRIP: NEGATIVE
IMM GRANULOCYTES # BLD AUTO: 0 K/UL (ref 0–0.04)
IMM GRANULOCYTES NFR BLD AUTO: 0 % (ref 0–0.5)
INR PPP: 1 (ref 0.9–1.1)
KETONES UR QL STRIP.AUTO: ABNORMAL MG/DL
LEUKOCYTE ESTERASE UR QL STRIP.AUTO: NEGATIVE
LYMPHOCYTES # BLD: 1.6 K/UL (ref 0.8–3.5)
LYMPHOCYTES NFR BLD: 25 % (ref 12–49)
MCH RBC QN AUTO: 28.6 PG (ref 26–34)
MCHC RBC AUTO-ENTMCNC: 31.4 G/DL (ref 30–36.5)
MCV RBC AUTO: 91.1 FL (ref 80–99)
MONOCYTES # BLD: 0.8 K/UL (ref 0–1)
MONOCYTES NFR BLD: 13 % (ref 5–13)
NEUTS SEG # BLD: 3.7 K/UL (ref 1.8–8)
NEUTS SEG NFR BLD: 57 % (ref 32–75)
NITRITE UR QL STRIP.AUTO: NEGATIVE
NRBC # BLD: 0 K/UL (ref 0–0.01)
NRBC BLD-RTO: 0 PER 100 WBC
PH UR STRIP: 5.5 [PH] (ref 5–8)
PLATELET # BLD AUTO: 252 K/UL (ref 150–400)
PMV BLD AUTO: 9.5 FL (ref 8.9–12.9)
POTASSIUM SERPL-SCNC: 3.8 MMOL/L (ref 3.5–5.1)
PROT SERPL-MCNC: 6.9 G/DL (ref 6.4–8.2)
PROT UR STRIP-MCNC: 30 MG/DL
PROTHROMBIN TIME: 10.3 SEC (ref 9–11.1)
RBC # BLD AUTO: 4.72 M/UL (ref 3.8–5.2)
RBC #/AREA URNS HPF: ABNORMAL /HPF (ref 0–5)
SODIUM SERPL-SCNC: 144 MMOL/L (ref 136–145)
SP GR UR REFRACTOMETRY: 1.02 (ref 1–1.03)
SPECIMEN EXP DATE BLD: NORMAL
THERAPEUTIC RANGE,PTTT: NORMAL SECS (ref 58–77)
UA: UC IF INDICATED,UAUC: ABNORMAL
UROBILINOGEN UR QL STRIP.AUTO: 1 EU/DL (ref 0.2–1)
WBC # BLD AUTO: 6.5 K/UL (ref 3.6–11)
WBC URNS QL MICRO: ABNORMAL /HPF (ref 0–4)

## 2022-12-28 PROCEDURE — 85610 PROTHROMBIN TIME: CPT

## 2022-12-28 PROCEDURE — 81001 URINALYSIS AUTO W/SCOPE: CPT

## 2022-12-28 PROCEDURE — 85730 THROMBOPLASTIN TIME PARTIAL: CPT

## 2022-12-28 PROCEDURE — 36415 COLL VENOUS BLD VENIPUNCTURE: CPT

## 2022-12-28 PROCEDURE — 83036 HEMOGLOBIN GLYCOSYLATED A1C: CPT

## 2022-12-28 PROCEDURE — 86900 BLOOD TYPING SEROLOGIC ABO: CPT

## 2022-12-28 PROCEDURE — 93005 ELECTROCARDIOGRAM TRACING: CPT

## 2022-12-28 PROCEDURE — 80053 COMPREHEN METABOLIC PANEL: CPT

## 2022-12-28 PROCEDURE — 85025 COMPLETE CBC W/AUTO DIFF WBC: CPT

## 2022-12-28 NOTE — PERIOP NOTES
[address] [address] [facility]   MAIN OR                                  [phone]   MAIN PRE OP                          [phone]                                                                                AMBULATORY PRE OP          [phone]  PRE-ADMISSION TESTING    [phone]   Surgery Date:  Monday January 9, 2023       Is surgery arrival time given by surgeon? YES  NO  If NO, [address] [facility] staff will call you between 3 and 7pm the day before your surgery with your arrival time. (If your surgery is on a Monday, we will call you the Friday before.)    Call [phone] after 7pm Monday-Friday if you did not receive this call. INSTRUCTIONS BEFORE YOUR SURGERY   When You  Arrive Arrive at the 2nd [address] [facility] on the day of your surgery  Have your insurance card, photo ID, and any copayment (if needed)   Food   and   Drink NO food or drink after midnight the night before surgery    This means NO water, gum, mints, coffee, juice, etc.  No alcohol (beer, wine, liquor) 24 hours before and after surgery   Medications to   TAKE   Morning of Surgery MEDICATIONS TO TAKE THE MORNING OF SURGERY WITH A SIP OF WATER:   None   Medications  To  STOP      7 days before surgery Non-Steroidal anti-inflammatory Drugs (NSAID's): for example, Ibuprofen (Advil, Motrin), Naproxen (Aleve)  Aspirin, if taking for pain   Herbal supplements, vitamins, and fish oil  (Pain medications not listed above, including Tylenol may be taken)   Blood  Thinners Not applicable. Bathing Clothing  Jewelry  Valuables     If you shower the morning of surgery, please do not apply anything to your skin (lotions, powders, deodorant, or makeup, especially mascara)  Follow Chlorhexidine Care Fusion body wash instructions provided to you during PAT appointment. Begin 3 days prior to surgery.   Do not shave or trim anywhere 24 hours before surgery  Wear your hair loose or down; no pony-tails, buns, or metal hair clips  Wear loose, comfortable, clean clothes  Wear glasses and bring case. Leave money, valuables, and jewelry, including body piercings, at home     Going Home - or Spending the Night SAME-DAY SURGERY: You must have a responsible adult drive you home and stay with you 24 hours after surgery   Special Instructions Free  parking Monday-Friday 7a-5p at the main front entrance. Follow all instructions so your surgery wont be cancelled. Please, be on time. If a situation occurs and you are delayed the day of surgery, call [phone]. If your physical condition changes (like a fever, cold, flu, etc.) call your surgeon. Home medication(s) reviewed and verified via LIST   VERBAL   during PAT appointment. The patient was contacted by   IN-PERSON  The patient verbalizes understanding of all instructions and  DOES NOT   need reinforcement.

## 2022-12-29 LAB
ATRIAL RATE: 82 BPM
BACTERIA SPEC CULT: NORMAL
BACTERIA SPEC CULT: NORMAL
CALCULATED P AXIS, ECG09: 68 DEGREES
CALCULATED R AXIS, ECG10: -39 DEGREES
CALCULATED T AXIS, ECG11: 54 DEGREES
DIAGNOSIS, 93000: NORMAL
P-R INTERVAL, ECG05: 156 MS
Q-T INTERVAL, ECG07: 360 MS
QRS DURATION, ECG06: 90 MS
QTC CALCULATION (BEZET), ECG08: 420 MS
SERVICE CMNT-IMP: NORMAL
VENTRICULAR RATE, ECG03: 82 BPM

## 2023-02-27 ENCOUNTER — HOSPITAL ENCOUNTER (OUTPATIENT)
Dept: PREADMISSION TESTING | Age: 78
Discharge: HOME | End: 2023-02-27
Payer: MEDICARE

## 2023-02-27 VITALS
DIASTOLIC BLOOD PRESSURE: 67 MMHG | SYSTOLIC BLOOD PRESSURE: 144 MMHG | TEMPERATURE: 97.5 F | RESPIRATION RATE: 20 BRPM | BODY MASS INDEX: 29.77 KG/M2 | WEIGHT: 168 LBS | OXYGEN SATURATION: 97 % | HEART RATE: 73 BPM | HEIGHT: 63 IN

## 2023-02-27 LAB
ABO + RH BLD: NORMAL
ALBUMIN SERPL-MCNC: 3.8 G/DL (ref 3.5–5)
ALBUMIN/GLOB SERPL: 1.1 (ref 1.1–2.2)
ALP SERPL-CCNC: 71 U/L (ref 45–117)
ALT SERPL-CCNC: 22 U/L (ref 12–78)
ANION GAP SERPL CALC-SCNC: 4 MMOL/L (ref 5–15)
APPEARANCE UR: CLEAR
APTT PPP: 24 SEC (ref 22.1–31)
AST SERPL-CCNC: 14 U/L (ref 15–37)
BACTERIA URNS QL MICRO: ABNORMAL /HPF
BASOPHILS # BLD: 0.1 K/UL (ref 0–0.1)
BASOPHILS NFR BLD: 1 % (ref 0–1)
BILIRUB SERPL-MCNC: 0.7 MG/DL (ref 0.2–1)
BILIRUB UR QL: NEGATIVE
BLOOD GROUP ANTIBODIES SERPL: NORMAL
BUN SERPL-MCNC: 13 MG/DL (ref 6–20)
BUN/CREAT SERPL: 22 (ref 12–20)
CALCIUM SERPL-MCNC: 9.8 MG/DL (ref 8.5–10.1)
CHLORIDE SERPL-SCNC: 107 MMOL/L (ref 97–108)
CO2 SERPL-SCNC: 29 MMOL/L (ref 21–32)
COLOR UR: ABNORMAL
CREAT SERPL-MCNC: 0.59 MG/DL (ref 0.55–1.02)
DIFFERENTIAL METHOD BLD: NORMAL
EOSINOPHIL # BLD: 0.3 K/UL (ref 0–0.4)
EOSINOPHIL NFR BLD: 4 % (ref 0–7)
EPITH CASTS URNS QL MICRO: ABNORMAL /LPF
ERYTHROCYTE [DISTWIDTH] IN BLOOD BY AUTOMATED COUNT: 14.2 % (ref 11.5–14.5)
GLOBULIN SER CALC-MCNC: 3.5 G/DL (ref 2–4)
GLUCOSE SERPL-MCNC: 92 MG/DL (ref 65–100)
GLUCOSE UR STRIP.AUTO-MCNC: NEGATIVE MG/DL
HCT VFR BLD AUTO: 45.6 % (ref 35–47)
HGB BLD-MCNC: 14.7 G/DL (ref 11.5–16)
HGB UR QL STRIP: ABNORMAL
IMM GRANULOCYTES # BLD AUTO: 0 K/UL (ref 0–0.04)
IMM GRANULOCYTES NFR BLD AUTO: 0 % (ref 0–0.5)
INR PPP: 1 (ref 0.9–1.1)
KETONES UR QL STRIP.AUTO: NEGATIVE MG/DL
LEUKOCYTE ESTERASE UR QL STRIP.AUTO: ABNORMAL
LYMPHOCYTES # BLD: 1.8 K/UL (ref 0.8–3.5)
LYMPHOCYTES NFR BLD: 28 % (ref 12–49)
MCH RBC QN AUTO: 29.2 PG (ref 26–34)
MCHC RBC AUTO-ENTMCNC: 32.2 G/DL (ref 30–36.5)
MCV RBC AUTO: 90.7 FL (ref 80–99)
MONOCYTES # BLD: 0.5 K/UL (ref 0–1)
MONOCYTES NFR BLD: 7 % (ref 5–13)
NEUTS SEG # BLD: 3.8 K/UL (ref 1.8–8)
NEUTS SEG NFR BLD: 60 % (ref 32–75)
NITRITE UR QL STRIP.AUTO: NEGATIVE
NRBC # BLD: 0 K/UL (ref 0–0.01)
NRBC BLD-RTO: 0 PER 100 WBC
PH UR STRIP: 5.5 (ref 5–8)
PLATELET # BLD AUTO: 260 K/UL (ref 150–400)
PMV BLD AUTO: 9.2 FL (ref 8.9–12.9)
POTASSIUM SERPL-SCNC: 4 MMOL/L (ref 3.5–5.1)
PROT SERPL-MCNC: 7.3 G/DL (ref 6.4–8.2)
PROT UR STRIP-MCNC: ABNORMAL MG/DL
PROTHROMBIN TIME: 10.4 SEC (ref 9–11.1)
RBC # BLD AUTO: 5.03 M/UL (ref 3.8–5.2)
RBC #/AREA URNS HPF: ABNORMAL /HPF (ref 0–5)
SODIUM SERPL-SCNC: 140 MMOL/L (ref 136–145)
SP GR UR REFRACTOMETRY: 1.02 (ref 1–1.03)
SPECIMEN EXP DATE BLD: NORMAL
THERAPEUTIC RANGE,PTTT: NORMAL SECS (ref 58–77)
UA: UC IF INDICATED,UAUC: ABNORMAL
UROBILINOGEN UR QL STRIP.AUTO: 0.2 EU/DL (ref 0.2–1)
WBC # BLD AUTO: 6.5 K/UL (ref 3.6–11)
WBC URNS QL MICRO: ABNORMAL /HPF (ref 0–4)

## 2023-02-27 PROCEDURE — 81001 URINALYSIS AUTO W/SCOPE: CPT

## 2023-02-27 PROCEDURE — 85730 THROMBOPLASTIN TIME PARTIAL: CPT

## 2023-02-27 PROCEDURE — 86900 BLOOD TYPING SEROLOGIC ABO: CPT

## 2023-02-27 PROCEDURE — 36415 COLL VENOUS BLD VENIPUNCTURE: CPT

## 2023-02-27 PROCEDURE — 85610 PROTHROMBIN TIME: CPT

## 2023-02-27 PROCEDURE — 80053 COMPREHEN METABOLIC PANEL: CPT

## 2023-02-27 PROCEDURE — 85025 COMPLETE CBC W/AUTO DIFF WBC: CPT

## 2023-02-27 NOTE — PERIOP NOTES
[address] [place] [facility], [address] [facility]   MAIN OR                                  [phone]   MAIN PRE OP                          [phone]                                                                                AMBULATORY PRE OP          [phone]  PRE-ADMISSION TESTING    [phone]   Surgery Date:  Thursday, 3/9/2023       Is surgery arrival time given by surgeon? NO  If NO, [facility] staff will call you between 3 and 7pm the day before your surgery with your arrival time. (If your surgery is on a Monday, we will call you the Friday before.)    Call [phone] after 7pm Monday-Friday if you did not receive this call. INSTRUCTIONS BEFORE YOUR SURGERY   When You  Arrive Arrive at the [address]nd [address] [facility] on the day of your surgery  Have your insurance card, photo ID, and any copayment (if needed)   Food   and   Drink NO food or drink after midnight the night before surgery    This means NO water, gum, mints, coffee, juice, etc.  No alcohol (beer, wine, liquor) 24 hours before and after surgery   Medications to   TAKE   Morning of Surgery MEDICATIONS TO TAKE THE MORNING OF SURGERY WITH A SIP OF WATER:   None   Medications  To  STOP      7 days before surgery Non-Steroidal anti-inflammatory Drugs (NSAID's): for example, Ibuprofen (Advil, Motrin), Naproxen (Aleve)  Aspirin, if taking for pain   Herbal supplements, vitamins, and fish oil (including Red yeast rice, and CoQ-10)  OK to continue your Vitamin D, and Zenpep enzymes. Other:  (Pain medications not listed above, including Tylenol may be taken)   Blood  Thinners If you take  Aspirin, Plavix, Coumadin, or any blood-thinning or anti-blood clot medicine, talk to the doctor who prescribed the medications for pre-operative instructions.    Bathing Clothing  Jewelry  Valuables     If you shower the morning of surgery, please do not apply anything to your skin (lotions, powders, deodorant, or makeup, especially mascara)  Follow Chlorhexidine Care Fusion body wash instructions provided to you during PAT appointment. Begin 3 days prior to surgery. Do not shave or trim anywhere 24 hours before surgery  Wear your hair loose or down; no pony-tails, buns, or metal hair clips  Wear loose, comfortable, clean clothes  Wear glasses instead of contacts  Leave money, valuables, and jewelry, including body piercings, at home  If you were given an [employer], bring it on day of surgery. Going Home - or Spending the Night SAME-DAY SURGERY: You must have a responsible adult drive you home and stay with you 24 hours after surgery  ADMITS: If your doctor is keeping you in the hospital after surgery, leave personal belongings/luggage in your car until you have a hospital room number. Hospital discharge time is 12 noon  Drivers must be here before 12 noon unless you are told differently   Special Instructions Free  parking from 7am-5pm.       Follow all instructions so your surgery wont be cancelled. Please, be on time. If a situation occurs and you are delayed the day of surgery, call  [phone]. If your physical condition changes (like a fever, cold, flu, etc.) call your surgeon. Home medication(s) reviewed and verified via LIST   VERBAL   during PAT appointment. The patient was contacted by IN-PERSON  The patient verbalizes understanding of all instructions and  DOES NOT   need reinforcement.

## 2023-02-27 NOTE — H&P
Ulises Davila was referred for evaluation by:Dr. Raine Metz for Pre- Op Evaluation. Please see encounter details and orders for consultative summary. Type of surgery : Left total shoulder arthroplasty with biceps tenodesis and axillary nerve dissection  Surgery site : Left shoulder  Anesthesia type: General with Exparel regional nerve block  Date of procedure:  3/9/2023    This 69y.o. year old female presents with complaints of  left shoulder pain for years has lost a significant amount of movement. She is s/p Right total shoulder arthroplasty in July 2022 and is doing well. Conservative measures including medical management and physical therapy have been exhausted prior to the decision for surgery. Patient has discussed the risks, alternatives, and benefits of the surgery with surgeon and has elected to proceed with surgical intervention. She was originally scheduled for surgery in January but came down with Covid infection and surgery was cancelled. Allergies: Allergies   Allergen Reactions    Demerol [Meperidine] Nausea and Vomiting     Latex allergy: no  Prior reactions to anesthesia:  None     Current Outpatient Medications   Medication Sig    ubidecarenone/vitamin E mixed (COQ10  PO) Take 1 Tablet by mouth two (2) times a day. lipase/protease/amylase (ZENPEP PO) Take 2 Caplet by mouth three (3) times daily (with meals). ascorbic acid, vitamin C, (VITAMIN C) 500 mg tablet Take 500 mg by mouth Every morning.    ergocalciferol (ERGOCALCIFEROL) 1,250 mcg (50,000 unit) capsule Take 50,000 Units by mouth every Sunday. lipase/protease/amylase (ZENPEP PO) Take 1 Caplet by mouth. WITH EVERY SNACK    red yeast rice 600 mg tab Take 1 Capsule by mouth two (2) times a day. cholecalciferol, vitamin D3, 50 mcg (2,000 unit) tab Take 1 Tablet by mouth Every morning. Indications: prevention of vitamin D deficiency     No current facility-administered medications for this encounter.      Past Medical History:   Diagnosis Date    COVID-19 2023    Endometrial cancer [facility])     Exocrine pancreatic insufficiency     GERD (gastroesophageal reflux disease)     resolved, off meds    H/O kidney donation     right kidney; donated to brother    HTN (hypertension)     per PCP note    Osteoarthritis      Past Surgical History:   Procedure Laterality Date    COLONOSCOPY N/A 2019    COLONOSCOPY performed by Birdie Fuentes MD at [facility] ENDOSCOPY    COLONOSCOPY N/A 12/15/2022    COLONOSCOPY performed by Birdie Fuentes MD at [facility] ENDOS[facility]    HX HIP REPLACEMENT Bilateral     HX KNEE ARTHROSCOPY Bilateral     HX OPEN CHOLECYSTECTOMY      HX ORTHOPAEDIC Left     RING FINGER AND PINKY TRIGGER RELEASE    HX OTHER SURGICAL Right     kidney donation    HX SHOULDER REPLACEMENT Right 2022    HX TOTAL ABDOMINAL HYSTERECTOMY       Social History     Tobacco Use    Smoking status: Former     Packs/day: 0.25     Years: 10.00     Pack years: 2.50     Types: Cigarettes     Quit date:      Years since quittin.1    Smokeless tobacco: Never   Vaping Use    Vaping Use: Never used   Substance Use Topics    Alcohol use:  Yes     Alcohol/week: 3.0 standard drinks     Types: 1 Glasses of wine, 1 Cans of beer, 1 Shots of liquor per week    Drug use: Never     Family History   Problem Relation Age of Onset    Cancer Father 79        colon/liver    Kidney Disease Brother         recipient of kidney transplant    Cancer Brother 79        colon    Kidney Disease Brother     Breast Cancer Maternal Aunt     Breast Cancer Maternal Grandmother         Pre-Operative Risk Assessment Using 2014 ACC/AHA Guidelines     Emergent procedure: No  Active Cardiac Condition including decompensated HF, Arrhythmia, MI <3 weeks, severe valve disease: No  Risk Level of Procedure: Intermediate Risk (intraperitoneal, intrathoracic, HENT, orthopedic, or carotid endarterectomy, etc.)  Revised Cardiac Risk Index Risk factors: None  3.9% risk of cardiac complications during or around noncardiac surgery (30 day risk of Death, MI or Cardiac arrest). Measurement of Exercise Tolerance before Surgery >4 METS (climbing > 1 flight of stairs without stopping, walking up hill > 1-2 blocks, scrubbing floors, moving furniture, golf, bowling, dancing or tennis, or running short distance): Yes    History of cardiac (including arrhythmic or valvular) or lung issues? No  Personal or family of bleeding issues? No  Hx of HTN? yes        Controlled? yes     Smoker? No  Etoh or other substance use? 3 drinks weekly     On anticoagulation, insulin, or steroids? No  Any concern with current medications? No  Pacemaker present? No        and if so, has it been interrogated in the last 12 months? N/A  Known DARIUS? No   Known liver disease? No    Blood pressure (!) 141/85, pulse 73, temperature 97.5 °F (36.4 °C), resp. rate 20, height 5' 3\" (1.6 m), weight 76.2 kg (168 lb), SpO2 97 %. Review of Systems  Review of Systems   Constitutional:  Negative for chills, fatigue and fever. HENT:  Positive for hearing loss (bilateral hearing aids). Negative for congestion, ear pain, postnasal drip and trouble swallowing. Eyes:  Negative for discharge. Respiratory:  Negative for cough, shortness of breath and wheezing. Cardiovascular:  Negative for chest pain, palpitations and leg swelling. Gastrointestinal:  Negative for abdominal pain, constipation, diarrhea, nausea and vomiting. Genitourinary:  Negative for dysuria, flank pain, frequency and urgency. Musculoskeletal:  Positive for arthralgias (left shoulder). Skin:  Negative for rash and wound. Neurological:  Negative for light-headedness and headaches. Psychiatric/Behavioral:  Negative for dysphoric mood. The patient is not nervous/anxious. Physical Exam  Vitals and nursing note reviewed. Constitutional:       General: She is not in acute distress. Appearance: Normal appearance.    HENT: Head: Normocephalic and atraumatic. Right Ear: Tympanic membrane, ear canal and external ear normal.      Left Ear: Tympanic membrane and external ear normal.      Nose: Nose normal.      Mouth/Throat:      Mouth: Mucous membranes are moist.      Pharynx: Oropharynx is clear. Eyes:      Extraocular Movements: Extraocular movements intact. Cardiovascular:      Rate and Rhythm: Normal rate and regular rhythm. Pulses: Normal pulses. Heart sounds: Normal heart sounds. Pulmonary:      Effort: Pulmonary effort is normal.      Breath sounds: Normal breath sounds. Abdominal:      General: Bowel sounds are normal.      Palpations: Abdomen is soft. Tenderness: There is no abdominal tenderness. There is no right CVA tenderness or left CVA tenderness. Musculoskeletal:      Left shoulder: Bony tenderness present. Decreased range of motion. Cervical back: Normal range of motion and neck supple. Skin:     General: Skin is warm and dry. Neurological:      General: No focal deficit present. Mental Status: She is alert and oriented to person, place, and time. Psychiatric:         Mood and Affect: Mood normal.        Assessment/Plan    Labs and EKG pending  She is considered medically stable and at low risk of cardiopulmonary complications related to upcoming surgical procedure. According to the 2014 ACC/AHA pre-operative risk assessment guidelines Aamir Sheffield is at low risk for major cardiac complications during a Intermediate Risk procedure, exercise tolerance is >4 METS  and procedure may proceed as planned.      Request further recommendations from consultants: None

## 2023-02-27 NOTE — H&P (VIEW-ONLY)
Emerykaushik Oneil was referred for evaluation by:Dr. Mayorga Plan for Pre- Op Evaluation. Please see encounter details and orders for consultative summary. Type of surgery : Left total shoulder arthroplasty with biceps tenodesis and axillary nerve dissection  Surgery site : Left shoulder  Anesthesia type: General with Exparel regional nerve block  Date of procedure:  3/9/2023    This 69y.o. year old female presents with complaints of  left shoulder pain for years has lost a significant amount of movement. She is s/p Right total shoulder arthroplasty in July 2022 and is doing well. Conservative measures including medical management and physical therapy have been exhausted prior to the decision for surgery. Patient has discussed the risks, alternatives, and benefits of the surgery with surgeon and has elected to proceed with surgical intervention. She was originally scheduled for surgery in January but came down with Covid infection and surgery was cancelled. Allergies: Allergies   Allergen Reactions    Demerol [Meperidine] Nausea and Vomiting     Latex allergy: no  Prior reactions to anesthesia:  None     Current Outpatient Medications   Medication Sig    ubidecarenone/vitamin E mixed (COQ10  PO) Take 1 Tablet by mouth two (2) times a day. lipase/protease/amylase (ZENPEP PO) Take 2 Caplet by mouth three (3) times daily (with meals). ascorbic acid, vitamin C, (VITAMIN C) 500 mg tablet Take 500 mg by mouth Every morning.    ergocalciferol (ERGOCALCIFEROL) 1,250 mcg (50,000 unit) capsule Take 50,000 Units by mouth every Sunday. lipase/protease/amylase (ZENPEP PO) Take 1 Caplet by mouth. WITH EVERY SNACK    red yeast rice 600 mg tab Take 1 Capsule by mouth two (2) times a day. cholecalciferol, vitamin D3, 50 mcg (2,000 unit) tab Take 1 Tablet by mouth Every morning. Indications: prevention of vitamin D deficiency     No current facility-administered medications for this encounter.      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    HTN (hypertension)     per PCP note    Osteoarthritis      Past Surgical History:   Procedure Laterality Date    COLONOSCOPY N/A 2019    COLONOSCOPY performed by Loreda Ganser, MD at [facility] ENDOSCOPY    COLONOSCOPY N/A 12/15/2022    COLONOSCOPY performed by Loreda Ganser, MD at [facility] ENDOSCOPY    HX HIP REPLACEMENT Bilateral     HX KNEE ARTHROSCOPY Bilateral     HX OPEN CHOLECYSTECTOMY      HX ORTHOPAEDIC Left     RING FINGER AND PINKY TRIGGER RELEASE    HX OTHER SURGICAL Right     kidney donation    HX SHOULDER REPLACEMENT Right 2022    HX TOTAL ABDOMINAL HYSTERECTOMY       Social History     Tobacco Use    Smoking status: Former     Packs/day: 0.25     Years: 10.00     Pack years: 2.50     Types: Cigarettes     Quit date:      Years since quittin.1    Smokeless tobacco: Never   Vaping Use    Vaping Use: Never used   Substance Use Topics    Alcohol use:  Yes     Alcohol/week: 3.0 standard drinks     Types: 1 Glasses of wine, 1 Cans of beer, 1 Shots of liquor per week    Drug use: Never     Family History   Problem Relation Age of Onset    Cancer Father 79        colon/liver    Kidney Disease Brother         recipient of kidney transplant    Cancer Brother 79        colon    Kidney Disease Brother     Breast Cancer Maternal Aunt     Breast Cancer Maternal Grandmother         Pre-Operative Risk Assessment Using 2014 ACC/AHA Guidelines     Emergent procedure: No  Active Cardiac Condition including decompensated HF, Arrhythmia, MI <3 weeks, severe valve disease: No  Risk Level of Procedure: Intermediate Risk (intraperitoneal, intrathoracic, HENT, orthopedic, or carotid endarterectomy, etc.)  Revised Cardiac Risk Index Risk factors: None  3.9% risk of cardiac complications during or around noncardiac surgery (30 day risk of Death, MI or Cardiac arrest). Measurement of Exercise Tolerance before Surgery >4 METS (climbing > 1 flight of stairs without stopping, walking up hill > 1-2 blocks, scrubbing floors, moving furniture, golf, bowling, dancing or tennis, or running short distance): Yes    History of cardiac (including arrhythmic or valvular) or lung issues? No  Personal or family of bleeding issues? No  Hx of HTN? yes        Controlled? yes     Smoker? No  Etoh or other substance use? 3 drinks weekly     On anticoagulation, insulin, or steroids? No  Any concern with current medications? No  Pacemaker present? No        and if so, has it been interrogated in the last 12 months? N/A  Known DARIUS? No   Known liver disease? No    Blood pressure (!) 141/85, pulse 73, temperature 97.5 °F (36.4 °C), resp. rate 20, height 5' 3\" (1.6 m), weight 76.2 kg (168 lb), SpO2 97 %. Review of Systems  Review of Systems   Constitutional:  Negative for chills, fatigue and fever. HENT:  Positive for hearing loss (bilateral hearing aids). Negative for congestion, ear pain, postnasal drip and trouble swallowing. Eyes:  Negative for discharge. Respiratory:  Negative for cough, shortness of breath and wheezing. Cardiovascular:  Negative for chest pain, palpitations and leg swelling. Gastrointestinal:  Negative for abdominal pain, constipation, diarrhea, nausea and vomiting. Genitourinary:  Negative for dysuria, flank pain, frequency and urgency. Musculoskeletal:  Positive for arthralgias (left shoulder). Skin:  Negative for rash and wound. Neurological:  Negative for light-headedness and headaches. Psychiatric/Behavioral:  Negative for dysphoric mood. The patient is not nervous/anxious. Physical Exam  Vitals and nursing note reviewed. Constitutional:       General: She is not in acute distress. Appearance: Normal appearance.    HENT: Head: Normocephalic and atraumatic. Right Ear: Tympanic membrane, ear canal and external ear normal.      Left Ear: Tympanic membrane and external ear normal.      Nose: Nose normal.      Mouth/Throat:      Mouth: Mucous membranes are moist.      Pharynx: Oropharynx is clear. Eyes:      Extraocular Movements: Extraocular movements intact. Cardiovascular:      Rate and Rhythm: Normal rate and regular rhythm. Pulses: Normal pulses. Heart sounds: Normal heart sounds. Pulmonary:      Effort: Pulmonary effort is normal.      Breath sounds: Normal breath sounds. Abdominal:      General: Bowel sounds are normal.      Palpations: Abdomen is soft. Tenderness: There is no abdominal tenderness. There is no right CVA tenderness or left CVA tenderness. Musculoskeletal:      Left shoulder: Bony tenderness present. Decreased range of motion. Cervical back: Normal range of motion and neck supple. Skin:     General: Skin is warm and dry. Neurological:      General: No focal deficit present. Mental Status: She is alert and oriented to person, place, and time. Psychiatric:         Mood and Affect: Mood normal.        Assessment/Plan    Labs and EKG pending  She is considered medically stable and at low risk of cardiopulmonary complications related to upcoming surgical procedure. According to the 2014 ACC/AHA pre-operative risk assessment guidelines Enriqueta Lofton is at low risk for major cardiac complications during a Intermediate Risk procedure, exercise tolerance is >4 METS  and procedure may proceed as planned.      Request further recommendations from consultants: None

## 2023-02-28 LAB
BACTERIA SPEC CULT: NORMAL
BACTERIA SPEC CULT: NORMAL
SERVICE CMNT-IMP: NORMAL

## 2023-03-08 ENCOUNTER — ANESTHESIA EVENT (OUTPATIENT)
Dept: SURGERY | Age: 78
End: 2023-03-08
Payer: MEDICARE

## 2023-03-09 ENCOUNTER — ANESTHESIA (OUTPATIENT)
Dept: SURGERY | Age: 78
End: 2023-03-09
Payer: MEDICARE

## 2023-03-09 ENCOUNTER — APPOINTMENT (OUTPATIENT)
Dept: GENERAL RADIOLOGY | Age: 78
End: 2023-03-09
Attending: ORTHOPAEDIC SURGERY
Payer: MEDICARE

## 2023-03-09 ENCOUNTER — HOSPITAL ENCOUNTER (OUTPATIENT)
Age: 78
Setting detail: OUTPATIENT SURGERY
Discharge: HOME | End: 2023-03-09
Attending: ORTHOPAEDIC SURGERY | Admitting: ORTHOPAEDIC SURGERY
Payer: MEDICARE

## 2023-03-09 VITALS
HEART RATE: 77 BPM | DIASTOLIC BLOOD PRESSURE: 68 MMHG | HEIGHT: 63 IN | WEIGHT: 166.23 LBS | BODY MASS INDEX: 29.45 KG/M2 | OXYGEN SATURATION: 94 % | TEMPERATURE: 97.9 F | RESPIRATION RATE: 15 BRPM | SYSTOLIC BLOOD PRESSURE: 136 MMHG

## 2023-03-09 DIAGNOSIS — R52 PAIN: Primary | ICD-10-CM

## 2023-03-09 PROCEDURE — 74011000250 HC RX REV CODE- 250: Performed by: ORTHOPAEDIC SURGERY

## 2023-03-09 PROCEDURE — C1713 ANCHOR/SCREW BN/BN,TIS/BN: HCPCS | Performed by: ORTHOPAEDIC SURGERY

## 2023-03-09 PROCEDURE — 74011000250 HC RX REV CODE- 250: Performed by: ANESTHESIOLOGY

## 2023-03-09 PROCEDURE — 77030032497 HC WRP SHLDR WO BGS SOLM -B

## 2023-03-09 PROCEDURE — 77030018842 HC SOL IRR SOD CL 9% BAXT -A: Performed by: ORTHOPAEDIC SURGERY

## 2023-03-09 PROCEDURE — 77030040361 HC SLV COMPR DVT MDII -B

## 2023-03-09 PROCEDURE — 77030003601 HC NDL NRV BLK BBMI -A

## 2023-03-09 PROCEDURE — 77030002933 HC SUT MCRYL J&J -A: Performed by: ORTHOPAEDIC SURGERY

## 2023-03-09 PROCEDURE — 77030018673: Performed by: ORTHOPAEDIC SURGERY

## 2023-03-09 PROCEDURE — 77030006835 HC BLD SAW SAG STRY -B: Performed by: ORTHOPAEDIC SURGERY

## 2023-03-09 PROCEDURE — 74011250637 HC RX REV CODE- 250/637: Performed by: ANESTHESIOLOGY

## 2023-03-09 PROCEDURE — 73020 X-RAY EXAM OF SHOULDER: CPT

## 2023-03-09 PROCEDURE — 76210000034 HC AMBSU PH I REC 0.5 TO 1 HR: Performed by: ORTHOPAEDIC SURGERY

## 2023-03-09 PROCEDURE — 2709999900 HC NON-CHARGEABLE SUPPLY: Performed by: ORTHOPAEDIC SURGERY

## 2023-03-09 PROCEDURE — 74011250636 HC RX REV CODE- 250/636: Performed by: ANESTHESIOLOGY

## 2023-03-09 PROCEDURE — 74011000250 HC RX REV CODE- 250: Performed by: NURSE ANESTHETIST, CERTIFIED REGISTERED

## 2023-03-09 PROCEDURE — 77030040922 HC BLNKT HYPOTHRM STRY -A

## 2023-03-09 PROCEDURE — 74011250636 HC RX REV CODE- 250/636: Performed by: NURSE ANESTHETIST, CERTIFIED REGISTERED

## 2023-03-09 PROCEDURE — 77030026438 HC STYL ET INTUB CARD -A: Performed by: NURSE ANESTHETIST, CERTIFIED REGISTERED

## 2023-03-09 PROCEDURE — C9290 INJ, BUPIVACAINE LIPOSOME: HCPCS | Performed by: ANESTHESIOLOGY

## 2023-03-09 PROCEDURE — 74011250636 HC RX REV CODE- 250/636: Performed by: ORTHOPAEDIC SURGERY

## 2023-03-09 PROCEDURE — 76210000050 HC AMBSU PH II REC 0.5 TO 1 HR: Performed by: ORTHOPAEDIC SURGERY

## 2023-03-09 PROCEDURE — 77030016653 HC BUR OVL4 STRY -B: Performed by: ORTHOPAEDIC SURGERY

## 2023-03-09 PROCEDURE — 76030000021 HC AMB SURG 2 TO 2.5 HR INTENSV-TIER 1: Performed by: ORTHOPAEDIC SURGERY

## 2023-03-09 PROCEDURE — 76060000064 HC AMB SURG ANES 2 TO 2.5 HR: Performed by: ORTHOPAEDIC SURGERY

## 2023-03-09 PROCEDURE — 77030031139 HC SUT VCRL2 J&J -A: Performed by: ORTHOPAEDIC SURGERY

## 2023-03-09 PROCEDURE — 77030002922 HC SUT FBRWRE ARTH -B: Performed by: ORTHOPAEDIC SURGERY

## 2023-03-09 PROCEDURE — 77030021678 HC GLIDESCP STAT DISP VERT -B: Performed by: NURSE ANESTHETIST, CERTIFIED REGISTERED

## 2023-03-09 PROCEDURE — C1776 JOINT DEVICE (IMPLANTABLE): HCPCS | Performed by: ORTHOPAEDIC SURGERY

## 2023-03-09 PROCEDURE — 77030020268 HC MISC GENERAL SUPPLY: Performed by: ORTHOPAEDIC SURGERY

## 2023-03-09 PROCEDURE — 77030038692 HC WND DEB SYS IRMX -B: Performed by: ORTHOPAEDIC SURGERY

## 2023-03-09 PROCEDURE — 77030008684 HC TU ET CUF COVD -B: Performed by: NURSE ANESTHETIST, CERTIFIED REGISTERED

## 2023-03-09 PROCEDURE — 77030019908 HC STETH ESOPH SIMS -A: Performed by: NURSE ANESTHETIST, CERTIFIED REGISTERED

## 2023-03-09 DEVICE — SIMPLEX® HV WITH GENTAMICIN IS A FAST-SETTING ACRYLIC RESIN WITH ADDITION OF GENTAMICIN SULFATE FOR USE IN BONE SURGERY. MIXING THE TWO SEPARATE STERILE COMPONENTS PRODUCES A DUCTILE BONE CEMENT WHICH, AFTER HARDENING, FIXES THE IMPLANT AND TRANSFERS STRESSES PRODUCED DURING MOVEMENT EVENLY TO THE BONE. SIMPLEX® HV WITH GENTAMICINN CEMENT POWDER ALSO CONTAINS INSOLUBLE ZIRCONIUM DIOXIDE AS AN X-RAY CONTRAST MEDIUM. SIMPLEX® HV WITH GENTAMICIN DOES NOT EMIT A SIGNAL AND DOES NOT POSE A SAFETY RISK IN A MAGNETIC RESONANCE ENVIRONMENT.
Type: IMPLANTABLE DEVICE | Site: SHOULDER | Status: FUNCTIONAL
Brand: SIMPLEX HV WITH GENTAMICIN

## 2023-03-09 DEVICE — COMPONENT GLEN 4 PEG 5 SHLDR MOD: Type: IMPLANTABLE DEVICE | Site: SHOULDER | Status: FUNCTIONAL

## 2023-03-09 DEVICE — SHOULDER S1 TOT STD ANAT IMPL CAPPED S1 ZIM: Type: IMPLANTABLE DEVICE | Status: FUNCTIONAL

## 2023-03-09 DEVICE — IMPLANTABLE DEVICE: Type: IMPLANTABLE DEVICE | Site: SHOULDER | Status: FUNCTIONAL

## 2023-03-09 RX ORDER — ONDANSETRON 2 MG/ML
INJECTION INTRAMUSCULAR; INTRAVENOUS AS NEEDED
Status: DISCONTINUED | OUTPATIENT
Start: 2023-03-09 | End: 2023-03-09 | Stop reason: HOSPADM

## 2023-03-09 RX ORDER — LIDOCAINE HYDROCHLORIDE 20 MG/ML
INJECTION, SOLUTION EPIDURAL; INFILTRATION; INTRACAUDAL; PERINEURAL AS NEEDED
Status: DISCONTINUED | OUTPATIENT
Start: 2023-03-09 | End: 2023-03-09 | Stop reason: HOSPADM

## 2023-03-09 RX ORDER — SODIUM CHLORIDE, SODIUM LACTATE, POTASSIUM CHLORIDE, CALCIUM CHLORIDE 600; 310; 30; 20 MG/100ML; MG/100ML; MG/100ML; MG/100ML
75 INJECTION, SOLUTION INTRAVENOUS CONTINUOUS
Status: DISCONTINUED | OUTPATIENT
Start: 2023-03-09 | End: 2023-03-09 | Stop reason: HOSPADM

## 2023-03-09 RX ORDER — SODIUM CHLORIDE, SODIUM LACTATE, POTASSIUM CHLORIDE, CALCIUM CHLORIDE 600; 310; 30; 20 MG/100ML; MG/100ML; MG/100ML; MG/100ML
125 INJECTION, SOLUTION INTRAVENOUS CONTINUOUS
Status: DISCONTINUED | OUTPATIENT
Start: 2023-03-09 | End: 2023-03-09 | Stop reason: HOSPADM

## 2023-03-09 RX ORDER — ACETAMINOPHEN 325 MG/1
975 TABLET ORAL ONCE
Status: COMPLETED | OUTPATIENT
Start: 2023-03-09 | End: 2023-03-09

## 2023-03-09 RX ORDER — SULFAMETHOXAZOLE AND TRIMETHOPRIM 800; 160 MG/1; MG/1
1 TABLET ORAL EVERY 12 HOURS
Qty: 6 TABLET | Refills: 0 | Status: SHIPPED
Start: 2023-03-09 | End: 2023-03-12

## 2023-03-09 RX ORDER — ONDANSETRON 2 MG/ML
4 INJECTION INTRAMUSCULAR; INTRAVENOUS AS NEEDED
Status: DISCONTINUED | OUTPATIENT
Start: 2023-03-09 | End: 2023-03-09 | Stop reason: HOSPADM

## 2023-03-09 RX ORDER — HYDROCODONE BITARTRATE AND ACETAMINOPHEN 5; 325 MG/1; MG/1
1 TABLET ORAL
Qty: 42 TABLET | Refills: 0 | Status: SHIPPED
Start: 2023-03-09 | End: 2023-03-16

## 2023-03-09 RX ORDER — BUPIVACAINE HYDROCHLORIDE 2.5 MG/ML
INJECTION, SOLUTION EPIDURAL; INFILTRATION; INTRACAUDAL AS NEEDED
Status: DISCONTINUED | OUTPATIENT
Start: 2023-03-09 | End: 2023-03-09 | Stop reason: HOSPADM

## 2023-03-09 RX ORDER — SUCCINYLCHOLINE CHLORIDE 20 MG/ML
INJECTION INTRAMUSCULAR; INTRAVENOUS AS NEEDED
Status: DISCONTINUED | OUTPATIENT
Start: 2023-03-09 | End: 2023-03-09 | Stop reason: HOSPADM

## 2023-03-09 RX ORDER — ASPIRIN 81 MG/1
81 TABLET ORAL
Qty: 28 TABLET | Refills: 0 | Status: SHIPPED | OUTPATIENT
Start: 2023-03-09

## 2023-03-09 RX ORDER — LIDOCAINE HYDROCHLORIDE 10 MG/ML
0.1 INJECTION, SOLUTION EPIDURAL; INFILTRATION; INTRACAUDAL; PERINEURAL AS NEEDED
Status: DISCONTINUED | OUTPATIENT
Start: 2023-03-09 | End: 2023-03-09 | Stop reason: HOSPADM

## 2023-03-09 RX ORDER — PROPOFOL 10 MG/ML
INJECTION, EMULSION INTRAVENOUS AS NEEDED
Status: DISCONTINUED | OUTPATIENT
Start: 2023-03-09 | End: 2023-03-09 | Stop reason: HOSPADM

## 2023-03-09 RX ORDER — CELECOXIB 100 MG/1
100 CAPSULE ORAL ONCE
Status: COMPLETED | OUTPATIENT
Start: 2023-03-09 | End: 2023-03-09

## 2023-03-09 RX ORDER — OXYCODONE HYDROCHLORIDE 5 MG/1
5 TABLET ORAL
Status: DISCONTINUED | OUTPATIENT
Start: 2023-03-09 | End: 2023-03-09 | Stop reason: HOSPADM

## 2023-03-09 RX ORDER — DEXAMETHASONE SODIUM PHOSPHATE 4 MG/ML
INJECTION, SOLUTION INTRA-ARTICULAR; INTRALESIONAL; INTRAMUSCULAR; INTRAVENOUS; SOFT TISSUE AS NEEDED
Status: DISCONTINUED | OUTPATIENT
Start: 2023-03-09 | End: 2023-03-09 | Stop reason: HOSPADM

## 2023-03-09 RX ORDER — PHENYLEPHRINE HCL IN 0.9% NACL 0.4MG/10ML
SYRINGE (ML) INTRAVENOUS AS NEEDED
Status: DISCONTINUED | OUTPATIENT
Start: 2023-03-09 | End: 2023-03-09 | Stop reason: HOSPADM

## 2023-03-09 RX ORDER — FENTANYL CITRATE 50 UG/ML
INJECTION, SOLUTION INTRAMUSCULAR; INTRAVENOUS AS NEEDED
Status: DISCONTINUED | OUTPATIENT
Start: 2023-03-09 | End: 2023-03-09 | Stop reason: HOSPADM

## 2023-03-09 RX ORDER — DIPHENHYDRAMINE HYDROCHLORIDE 50 MG/ML
12.5 INJECTION, SOLUTION INTRAMUSCULAR; INTRAVENOUS AS NEEDED
Status: DISCONTINUED | OUTPATIENT
Start: 2023-03-09 | End: 2023-03-09 | Stop reason: HOSPADM

## 2023-03-09 RX ORDER — VANCOMYCIN HYDROCHLORIDE 1 G/20ML
INJECTION, POWDER, LYOPHILIZED, FOR SOLUTION INTRAVENOUS AS NEEDED
Status: DISCONTINUED | OUTPATIENT
Start: 2023-03-09 | End: 2023-03-09 | Stop reason: HOSPADM

## 2023-03-09 RX ORDER — ROCURONIUM BROMIDE 10 MG/ML
INJECTION, SOLUTION INTRAVENOUS AS NEEDED
Status: DISCONTINUED | OUTPATIENT
Start: 2023-03-09 | End: 2023-03-09 | Stop reason: HOSPADM

## 2023-03-09 RX ORDER — EPHEDRINE SULFATE/0.9% NACL/PF 50 MG/5 ML
SYRINGE (ML) INTRAVENOUS AS NEEDED
Status: DISCONTINUED | OUTPATIENT
Start: 2023-03-09 | End: 2023-03-09 | Stop reason: HOSPADM

## 2023-03-09 RX ORDER — EPINEPHRINE 1 MG/ML
INJECTION INTRAMUSCULAR; INTRAVENOUS; SUBCUTANEOUS AS NEEDED
Status: DISCONTINUED | OUTPATIENT
Start: 2023-03-09 | End: 2023-03-09 | Stop reason: HOSPADM

## 2023-03-09 RX ORDER — HYDROMORPHONE HYDROCHLORIDE 1 MG/ML
.25-1 INJECTION, SOLUTION INTRAMUSCULAR; INTRAVENOUS; SUBCUTANEOUS
Status: DISCONTINUED | OUTPATIENT
Start: 2023-03-09 | End: 2023-03-09 | Stop reason: HOSPADM

## 2023-03-09 RX ADMIN — PROPOFOL 50 MG: 10 INJECTION, EMULSION INTRAVENOUS at 11:34

## 2023-03-09 RX ADMIN — Medication 10 MG: at 11:02

## 2023-03-09 RX ADMIN — ONDANSETRON HYDROCHLORIDE 4 MG: 2 SOLUTION INTRAMUSCULAR; INTRAVENOUS at 12:05

## 2023-03-09 RX ADMIN — PROPOFOL 20 MG: 10 INJECTION, EMULSION INTRAVENOUS at 12:01

## 2023-03-09 RX ADMIN — Medication 10 MG: at 11:57

## 2023-03-09 RX ADMIN — Medication 80 MCG: at 11:47

## 2023-03-09 RX ADMIN — PROPOFOL 150 MG: 10 INJECTION, EMULSION INTRAVENOUS at 10:49

## 2023-03-09 RX ADMIN — Medication 80 MCG: at 11:13

## 2023-03-09 RX ADMIN — ACETAMINOPHEN 975 MG: 325 TABLET ORAL at 08:25

## 2023-03-09 RX ADMIN — FENTANYL CITRATE 50 MCG: 50 INJECTION, SOLUTION INTRAMUSCULAR; INTRAVENOUS at 11:37

## 2023-03-09 RX ADMIN — SODIUM CHLORIDE, POTASSIUM CHLORIDE, SODIUM LACTATE AND CALCIUM CHLORIDE 75 ML/HR: 600; 310; 30; 20 INJECTION, SOLUTION INTRAVENOUS at 08:27

## 2023-03-09 RX ADMIN — LIDOCAINE HYDROCHLORIDE 80 MG: 20 INJECTION, SOLUTION EPIDURAL; INFILTRATION; INTRACAUDAL; PERINEURAL at 10:49

## 2023-03-09 RX ADMIN — BUPIVACAINE HYDROCHLORIDE 20 ML: 2.5 INJECTION, SOLUTION EPIDURAL; INFILTRATION; INTRACAUDAL; PERINEURAL at 09:19

## 2023-03-09 RX ADMIN — SUCCINYLCHOLINE CHLORIDE 100 MG: 20 INJECTION, SOLUTION INTRAMUSCULAR; INTRAVENOUS at 10:49

## 2023-03-09 RX ADMIN — Medication 80 MCG: at 11:48

## 2023-03-09 RX ADMIN — PROPOFOL 50 MG: 10 INJECTION, EMULSION INTRAVENOUS at 11:08

## 2023-03-09 RX ADMIN — SUGAMMADEX 200 MG: 100 INJECTION, SOLUTION INTRAVENOUS at 12:12

## 2023-03-09 RX ADMIN — Medication 10 MG: at 11:17

## 2023-03-09 RX ADMIN — ROCURONIUM BROMIDE 30 MG: 10 INJECTION INTRAVENOUS at 10:55

## 2023-03-09 RX ADMIN — CEFAZOLIN SODIUM 2 G: 1 POWDER, FOR SOLUTION INTRAMUSCULAR; INTRAVENOUS at 11:00

## 2023-03-09 RX ADMIN — BUPIVACAINE 10 ML: 13.3 INJECTION, SUSPENSION, LIPOSOMAL INFILTRATION at 09:19

## 2023-03-09 RX ADMIN — ROCURONIUM BROMIDE 5 MG: 10 INJECTION INTRAVENOUS at 10:49

## 2023-03-09 RX ADMIN — Medication 80 MCG: at 11:25

## 2023-03-09 RX ADMIN — CELECOXIB 100 MG: 100 CAPSULE ORAL at 08:25

## 2023-03-09 RX ADMIN — DEXAMETHASONE SODIUM PHOSPHATE 4 MG: 4 INJECTION, SOLUTION INTRAMUSCULAR; INTRAVENOUS at 11:17

## 2023-03-09 RX ADMIN — FENTANYL CITRATE 100 MCG: 50 INJECTION, SOLUTION INTRAMUSCULAR; INTRAVENOUS at 09:12

## 2023-03-09 NOTE — ANESTHESIA PREPROCEDURE EVALUATION
Relevant Problems   No relevant active problems       Anesthetic History               Review of Systems / Medical History  Patient summary reviewed, nursing notes reviewed and pertinent labs reviewed    Pulmonary                   Neuro/Psych              Cardiovascular    Hypertension                Comments: Denied recent cv/pulm complaints or changes. GI/Hepatic/Renal     GERD          Comments: Denied active reflux symptoms Endo/Other        Arthritis     Other Findings              Physical Exam    Airway  Mallampati: II  TM Distance: > 6 cm  Neck ROM: normal range of motion   Mouth opening: Normal     Cardiovascular  Regular rate and rhythm,  S1 and S2 normal,  no murmur, click, rub, or gallop  Rhythm: regular  Rate: normal         Dental      Comments: No loose teeth.    Pulmonary  Breath sounds clear to auscultation               Abdominal  Abdominal exam normal       Other Findings            Anesthetic Plan    ASA: 3  Anesthesia type: general and regional - interscalene block          Induction: Intravenous  Anesthetic plan and risks discussed with: Patient and Family

## 2023-03-09 NOTE — BRIEF OP NOTE
BRIEF OPERATIVE NOTE    Date of Procedure: 3/9/2023   Preoperative Diagnosis: Osteoarthritis of left glenohumeral joint [M19.012]  Postoperative Diagnosis: Osteoarthritis of left glenohumeral joint [M19.012]    Procedure: Procedure(s):  LEFT TOTAL SHOULDER ARTHROPLASTY WITH BICEPS TENODESIS AND AXILLARY NERVE DISSECTION (GENERAL W/EXPAREL REGIONAL NERVE BLOCK)    Surgeon: Tierra Lombardi MD  Assistant(s): Last Flores PA-C, ATC  Anesthesia: General   Estimated Blood Loss: minimal  Specimens: * No specimens in log *   Findings: OA  Complications: None  Implants:   Implant Name Type Inv.  Item Serial No.  Lot No. LRB No. Used Action   BROADBAND LOOP   N/A [employer] [id number] Left 1 Implanted   BROADBAND LOOP   N/A [employer] [id number] Left 1 Implanted   COMPONENT KYLER 4 PEG 5 SHLDR MOD - SN/A  COMPONENT KYLER 4 PEG 5 SHLDR MOD N/A [employer]_ [id number] Left 1 Implanted   COMP PROSTHESIS GLENOID TRABECULAR SHOULDER PPS - SN/A Joint Component COMP PROSTHESIS GLENOID TRABECULAR SHOULDER PPS N/A [employer] [facility] [id number] Left 1 Implanted   CEMENT BNE SIMPLEX W/GENT -- 10/PK - SN/A  CEMENT BNE SIMPLEX W/GENT -- 10/PK N/A [employer] [facility]_ [id number] Left 1 Implanted   HEAD HUM BPLR [id number] -- VERSA-DIAL - SN/A  HEAD HUM BPLR [id number] -- VERSA-DIAL N/A [employer]_ [id number] Left 1 Implanted   STEM HUM FLOR MICRO 11MM -- COMPREHENSIVE - SN/A  STEM HUM FLOR MICRO 11MM -- COMPREHENSIVE N/A [employer]_ [id number] Left 1 Implanted   HEAD HUM VERSA-DIAL STD TAPR --  - SN/A  HEAD HUM VERSA-DIAL STD TAPR --  N/A [employer]_ [id number] Left 1 Implanted

## 2023-03-09 NOTE — DISCHARGE INSTRUCTIONS
Shoulder Surgery Discharge Instructions  Dr. Gray Hamilton    Please take the time to review the following instructions before you leave the hospital and use them as guidelines during your recovery from surgery. If you have any questions, you may contact my staff directly at [phone]. We are in clinic every morning and will be begin answering voicemails after 12:30pm.  If you need immediate assistance, call the main office number [phone]. The most efficient means of communication with the office is through [address] Wound Care / Dressing Change    Two days after surgery you should remove the big, bulky white dressing. Do NOT remove the clear, plastic dressing against your skin. Keep the clear, plastic dressing intact until your follow up in the office. Showering / Bathing    If the clear plastic dressing is intact and there is no drainage, you may shower. If the dressing is loose or water gets under it please notify our office. If there is drainage, please call the office immediately. Sling    Keep your arm in the immobilizer/sling at all times except when showering, changing your clothes, and doing the exercises shown to you by Dr. Juany Groves or your physical/occupational therapist prior to your discharge from the hospital.  Keep your arm at your side when changing your clothes and showering. Do not move it away from your body. Ice and Elevation    Ice therapy should be used consistently for 48 hours after surgery. Subsequently, you should ice 3 times per day for 20 minutes at a time. After the first week, you may use ice as needed for pain and swelling. Please ensure there is protective barrier between your skin and the ice. Diet    You may advance your regular diet as tolerated. Increase your clear liquid intake for the next 2-3 days. Medications  Your prescriptions were sent to the pharmacy on file.   We are unable to change the narcotic prescription that has already been sent today. If you would like to change your pharmacy for FUTURE prescriptions, please call the office. [facility]: [facility]      1. Pain: You were prescribed a narcotic medication for pain control. Please  use the medication as prescribed. Pain medications may cause constipation - Colace or Milk of Magnesia may be used as needed. Other possible side effects of pain medications are dizziness, headache, nausea, vomiting, and urinary retention. Discontinue the pain medication if you develop itching, rash, shortness of breath, or difficulties swallowing. If these symptoms become severe or arent relieved by discontinuing the medication, you should seek immediate medical attention. You cannot drive or operate machinery while taking narcotic medication. Some pain medications already contain Tylenol/Acetaminophen. Please read your prescription pain medicine bottle prior to taking additional Tylenol. Do not exceed 3000mg of Tylenol/Acetaminophen in a 24 hour period. Refills of pain medication are authorized during office hours only   ( Monday to Friday 8am-5pm)        2. Blood Thinner:  You have been given a prescription for Aspirin. Please take one tablet twice daily for 14 days. Do not take anti-inflammatory medication (Advil, Aleve, Motrin) while taking the blood thinner. 3. Antibiotic:  You will take the prescribed antibiotic as directed. 4. Flomax: If you were prescribed tamsulosin . 4mg you should take as prescribed. This medication can lower your blood pressure while taking it. 5. Stool Softeners:  Pain medications can cause constipation. Stool softeners, warm prune juice and increasing your water and fiber intake can help prevent constipation. Do not take laxatives. 6. You should resume other medications you were taking prior to your surgery.  Pain medication may change the effects of any antidepressant medication you are taking. If you have any questions about possible interactions between your regular medications and the pain medication you should consult the physician who prescribes your regular medications. Physical Therapy:  You must begin outpatient physical therapy 2-3 days after your surgery. Your were given a prescription when you scheduled your surgery. If you do not have an appointment scheduled or a therapy prescription please call Dr. Hayley Live' office immediately. APPOINTMENT: Violet 3-13 at 9am    Follow-Up Appointment:  Please follow up at your scheduled appointment 10-14 days from the day of your surgery. If you do not already have an appointment, please call our office at [phone] for your follow up appointment. Your appointment will likely be with Killian Hughes PA-C. Lance Trotter assists Dr. Hayley Live in surgery and will be able to review your operation and answer your questions. APPOINTMENT: 3-23 at 9:30am      Important Signs and Symptoms:  If any of the following signs and symptoms occurs, you should contact Dr. Osiris Millan office. Please be advised if a problem arises which you feel requires immediate medical attention or you are unable to contact Dr. Osiris Millan office, you should seek immediate medical attention. Signs and symptoms to watch for include:    A sudden increase in swelling and/or redness or warmth at the area of your surgery which isnt relieved by rest, ice, and elevation. Oral temperature greater than 101degrees for 12 hours or more which isnt relieved by an increase in fluid intake and taking two Tylenol every 4-6 hours. Excessive drainage from your incisions, or drainage which hasnt stopped by 72 hours after your surgery. Calf pain, ever, chills, shortness of breath, chest pain, nausea, vomiting or other signs and symptoms which are of concern to you.         Unless you are having a true medical emergency,   you MUST call Dr. Hayley Live' office   BEFORE proceeding to the Emergency Department. A provider is on call 24 hours/day. Exercises after Shoulder Surgery  1. Passive Forward Flexion:                             Instructions:  Lay on your back  Take your non surgical arm and grab the wrist of your surgical arm   Use your non surgical arm to lift your surgical arm over your head with the elbow straight   Slowly return your arm to your side using your non surgical arm  Repeat 20 times in the morning and 20 times in the evening  Remember:  Passive motion: Your arm is lifted with the help of your other hand. The repair is protected when you do passive motion  Active motion: You lift your arm by using your shoulder muscles. DO NOT DO ANY ACTIVE MOTION FOR NOW    2. Codman Pendulum Exercises                                         Instructions:  Bend forward about 90° at the waist and support yourself on a sturdy object with your non surgical arm  (bend slightly at the knees to protect your back)  Gently allow your surgical arm to hang towards the ground  Move your hips forward and backward allowing your arm to swing slightly  Arm can move forward, backward, and in small circles (clockwise and counterclockwise)  Remember: let your body move your arm, do not use your arm muscles  Begin performing the exercise for about 30 seconds. Progress to 2 minutes.   Repeat 2-3 times per day           DISCHARGE SUMMARY from Your Nurse    PATIENT INSTRUCTIONS:    AFTER ANESTHESIA & SEDATION, and WHILE TAKING PAIN MEDICINE  After general anesthesia / intravenous sedation and the 24 hours following, and/or while taking prescription Opiates:  Limit your activities  Do not drive and operate hazardous machinery until you have been of all narcotics and sedatives for over 24 hours  Do not make important personal or business decisions  Do  not drink alcoholic beverages  If you have not urinated within 8 hours after discharge, please contact your surgeon on call.        SIGNS OF INFECTION, THINGS TO REPORT TO YOUR DOCTOR  Report the following Signs of Infection or General Problems after surgery to your surgeon:  Excessive pain, swelling, redness, drainage, pus or odor of or around the surgical area  Fever/ temperature over 101; Temperature over 100 if on medications (chemotherapy or medicines which affect your ability to fight infections)  Nausea and vomiting lasting longer than 4 hours or if unable to take medications  Any signs of decreased circulation or nerve impairment to extremity: change in color, persistent  numbness, tingling, coldness or increase pain  If you have any questions. GOOD HELP TO FIGHT AN INFECTION  Here are a few tip to help reduce the chance of getting an infection after surgery:  Wash Your Hands  Good handwashing is the most important thing you and your caregiver can do. Wash before and after caring for any wounds. Dry your hand with a clean towel. Wash with soap and water for at least 20 seconds. A TIP: sing the \"Happy Birthday\" song through one time while washing to help with the timing. Use a hand  in between washings. Shower  When your surgeon says it is OK to take a shower, use a new bar of antibacterial soap (if that is what you use, and keep that bar of soap ONLY for your use), or antibacterial body wash. Use a clean wash cloth or sponge when you bathe. Dry off with a clean towel  after every bath - be careful around any wounds, skin staples, sutures or surgical glue over/on wounds. Do not enter swimming pools, hot tubs, lakes, rivers and/or ocean until wounds are healed and your doctor/surgeon says it is OK. Use Clean Sheets  Sleep on freshly laundered sheets after your surgery. Keep the surgery site covered with a clean, dry bandage (if instructed to do so). If the bandage becomes soiled, reapply a new, dry, clean bandage. Do not allow pets to sleep with you while your wound is healing.     Lifestyle Modification and Controlling Your Blood Sugar  Smoking slows wound healing. Stop smoking and limit exposure to second-hand smoke. High blood sugar slows wound healing. Eat a well-balanced diet to provide proper nutrition while healing  Monitor your blood sugar (if you are a diabetic) and take your medications as you are suppose to so you can control you blood sugar after surgery. URINARY RETENTION AFTER SURGERY  Some surgery (a planned, long surgery, bladder surgery, or some surgeries of the abdomen) require the placement of a haines catheter (a drain tube in the bladder.)  This drain is often removed prior to you coming out of the OR, or is occasionally left in place to be removed before discharge, or sometimes the drain tube is left to be removed in the surgeon's office at a later time. Other surgeries never require a drain in the bladder. But sometimes after surgery, even if a drain was never placed, going to the bathroom can become a problem (you feel like you have to pee, your bladder feels full, but you just cant go.)  In this case, you might need to return to the hospital to have a drain catheter placed. Call your surgeon and tell them if this problem happens to you. COUGH AND DEEP BREATHE  Breathing deep and coughing are very important exercises to do after surgery. Deep breathing and coughing open the little air tubes and air sacks in your lungs. You take deep breaths every day. You may not even notice - it is just something you do when you sigh or yawn. It is a natural exercise you do to keep these air passages open. After surgery, take deep breaths and cough, on purpose. Coughing and deep breathing help prevent bronchitis and pneumonia after surgery. If you had chest or belly surgery, use a pillow as a \"hug mya\" and hold it tightly to your chest or belly when you cough. DIRECTIONS:  Take 10 to 15 slow deep breaths every hour while awake.   Breathe in deeply, and hold it for 2 seconds. Exhale slowly through puckered lips, like blowing up a balloon. After every 4th or 5th deep breath, hug your pillow to your chest or belly and give a hard, deep cough. Yes, it will probably hurt if you had abdominal surgery. But doing this exercise is very important part of healing after surgery. Take your pain medicine to help you do this exercise without too much pain. ANKLE PUMPS    Ankle pumps increase the circulation of oxygenated blood to your lower extremities and decrease your risk for circulation problems such as blood clots. They also stretch the muscles, tendons and ligaments in your foot and ankle, and prevent joint contracture in the ankle and foot, especially after surgeries on the legs. It is important to do ankle pump exercises regularly after surgery because immobility increases your risk for developing a blood clot. Your doctor may also have you take an Aspirin for the next few days as well. If your doctor did not ask you to take an Aspirin, consult with him before starting Aspirin therapy on your own. The exercise is quite simple. Slowly point your foot forward, feeling the muscles on the top of your lower leg stretch, and hold this position for 5 seconds. Next, pull your foot back toward you as far as possible, stretching the calf muscles, and hold that position for 5 seconds. Repeat with the other foot. Perform 10 repetitions every hour while awake for both ankles if possible (down and then up with the foot once is one repetition). You should feel gentle stretching of the muscles in your lower leg when doing this exercise. If you feel pain, or your range of motion is limited, don't push too hard. Only go the limit your joint and muscles will let you go. If you have increasing pain, progressively worsening leg warmth or swelling, STOP the exercise and call your doctor.        Other Wound Care information:  [] No additional recommendations. P.R.I.C.E. INSTRUCTIONS    PRICE is an acronym that stands for Protect, Rest, Ice, Compression, and Elevation (sometimes you might see the acronym RICE.)   Listed below are five activities one can do for an injured limb or soft tissue injury. While much anecdotal understanding learned through many years of experience supports these seemingly common sense treatments, building scientific evidence is showing how and why these treatment principles are proving to be so beneficial.  Below is a breakdown of what the PRICE principles entail to speed healing along. PROTECT may sound like an obvious thing to do, and really, it is common sense. After an injury or surgery, protecting the site that hurts help to prevent further injury. REST is essential for an injured limb. Like protection, the more you are up on an injured limb, especially in the early stages of an injury, the more damage you can do. Rest means no activities that would involve the use of the injured tissues so that the early stages of healing can begin without  interruption. ICE \"is perhaps the simplest and oldest [therapy] in the treatment of soft tissue injuries. \"4    Ice help decrease swelling in inflamed and damaged tissues, can diminish the feeling of pain and decrease muscle spasms, and, immediately after an injury,  can slow cellular metabolism and help to prevent further tissue injury from oxygen starvation caused by the swelling. 5      COMPRESSION help decrease pain by limiting movement of an injured limb. Compression can be found through the use of an elastic wrap bandage, a cast, splint, or simply a snug cooling cuff or an ice pack and pillow. ELEVATION is a very important intervention. Placing the injury above the level of the heart whenever possible helps decrease swelling by using gravity to one's advantage .   Placing the injury above the level of the heart also helps prevent, or at least decrease, the throbbing pain that is sometimes experienced after surgery or injury. Sources:  Muscle injuries: optimizing recovery (2007) Best Practice & Research Clinical Rheumatology Vol. 21, No. 2, pp 317-331, Accessed 9/26/11    PRICE first aid guidelines - Protection, Rest, Ice, Compression and Elevation By Barb Orozco SeeMedia. com Guide, Updated March 27, 2011, Accessed 9/26/11 [email or web address]/[email or web address] htm    Rest Ice Compression Elevation: RICE for injuries, Accessed 9/26/11 LipLotion.ch. com/rest-ice-compression-elevation. html    The use of ice in the treatment of acute soft-tissue injury (2004) [place], Vol. 32, No. 1, pp 251-261, Accessed 9/26/11 [email or web address]/[email or web address]/[email or web address]+html    Soft tissue damage and healing; theory and techniques, www.iaaf.org, Ch. 9 of  medical page, by howie Dias And Monique Lozoya 9/27/11 FormerIdols.gl. pdf     Cold Therapy Instructions    Your nurse will provide a cold therapy wrap based upon your surgery, need, and your doctor's orders. INSTRUCTIONS FOR USE:  All cooling wraps produce sustained periods of intense cold. NEVER PLACE PAD ON BARE SKIN OR HAVE CONTACT WITH BARE SKIN  Always Use An Insulating Barrier. Tissue injury can occur if these devices are used improperly. Follow your surgeons instructions carefully regarding the frequency, duration and breaks from cold therapy, and the total length of treatment. Check under the pad barrier every 2 hours for skin injury (see below.)      SIGNS OF SKIN INJURY:  STOP USE AND CONTACT YOUR SURGEON if any of the following occur: Increased pain, burning, increased swelling, itching, blisters, increased redness, discoloration, welts, or other change in skin condition.       INDICATIONS:  Back, Hip, Knee or Shoulder Surgery and Post-Operative Treatment; Trauma; Orthopedic Rehabilitation      CONTRAINDICATIONS:  Cold therapy should not be used by persons with Diabetes, Raynaud's or other vasospastic disease, cold hypersensititvity, or compromised local circulation. Certain medical conditions make cold-induced injury more likely. Please consult with your healthcare provider before use. Going Home After A  Peripheral Nerve Block    Patient Information    The anesthesiology team has provided for your pain control through a technique called regional anesthesia. As the name implies, anesthesia (decreased or no pain, sensation, or motor control) has been provided to a specific region, whether that be an arm or a leg. How does this happen?  you might ask. While you were sleepy, the anesthesia provider provided medicine to a specific group of nerves either in the neck/shoulder region or in the groin and/or buttock region, similar to the way a dentist might numb a tooth (teeth.)  They typically use an ultrasound machine to know where the medicine is placed in relation to the nerves they wish to numb up or block.   What this means is that for the next few hours, you should expect to have a numb limb. Below are some things we wish for you to read and be familiar with concerning your anesthetized limb. Caring For a Blocked Body Part    General Considerations: The numbness may last up to 24 hours  You must protect your arm or leg. The blocked extremity is numb, weak, and difficult for your brain to locate and communicate with. To do this you should:  Pay attention to the position of the blocked limb at all times. Be very careful when placing hot or cod items on a numb limb. You could cause tissue damage like burns or frostbite if you are unable to feel temperature. Carefully follow your discharge instructions regarding the use of these therapies in you post-operative care. Carefully pad the affected limb.   Normally we continually move and adjust the position of our bodies without thinking about it. This movement and continuous repositioning helps to prevent injuries from immobility. When a limb is numb it still requires this care  Be extremely careful not to bump or hit the numb body part. This can result in an unrecognized injury, at lease until the blocked limb wakes up. It can also result in worse pain of your already post-surgical limb. Arm/Shoulder Blocks: You may experience a droopy eyelid, nasal stuffiness, and redness of the eye after receiving an arm/shoulder block. This is called Devs Syndrome, and is very common. There is no need for concern. You may also experience mild hoarseness, but all of these symptoms should resolve within 24 hours. Some patients may experience mild shortness of breath. A sitting position will help alleviate this and it should resolve within 24 hours. If you experience significant or progressive worsening of the shortness of breath, seek medical attention immediately. Contact Phone Numbers    CALL 911 IN CASE OF AN EMERGENCY. For all other non-emergency inquiries call the [facility]  at [phone] and ask for the anesthesiologist on call to be paged. Below is information on the medication(s) your doctor is prescribing for you: The maximum daily dose of acetaminophen was discussed with the patient. She was encouraged not to exceed 3,000 mg of acetaminophen during a 24 hour period and was asked to keep in mind that acetaminophen can also be found in many over-the-counter cold medications as well as narcotics that may be given for pain. The patient expresses understanding of these issues and questions were answered. 4 THINGS ABOUT PAIN MEDICINE I ALWAYS TALK ABOUT:  There are 4 side effects I always talk about for pain medications. They make you sleepy and drowsy. Do not drive a car or operate machines while taking pain medication. Do not make any major decisions. Take a nap. Relax. Let your body recover from the affects of anesthesia and surgery. Some people have quite a problem with itching and... Nausea and/or vomiting. These are mention together because they are a related genetic issue; while some people experience these problems, others do not. These are expected and know side effects. Itching is caused by histamine release - practically all opiate medications can cause this. An over-the-counter anti-histamine can help. Over-the-counter Benadryl® (the generic drug name is diphenhydramine) can help, but may cause increased drowsiness which can be intensified by pain medications. Over-the-counter Claritin® (the generic drug name is loratadine) or Zyrtec® (the generic drug name is cetirizine) may be effective without as much drowsiness as with the Benadryl/diphenhydramine. If you have nausea, like the itching, practically all opioids can cause this. Hopefully your surgeon may have given you some medicine for nausea. If your surgeon did not give you anti-nausea medications, and you are experiencing nausea/vomiting that prevent you from drinking clear liquids, CALL HIM/HER and request them, especially if these issues seem to get worse after you leave the hospital.    Last but not least is the problem of constipation (not chante able to have a bowel movement - poop.)  All pain medicine can slow down the movement of food through the gut. The slower it goes, the worse it can be. This only adds insult to the injury of surgery. And if you had tummy surgery, like having your gall bladder removed or a hernia repair, YOU DO NOT WANT THIS PROBLEM. There are 4 things I recommend. Drink lots of fluids. For healthy people with no heart problems, this means at least 64 ounces of liquids or more per day. For example, a Big Gulp® from [employer] is 32 ounces. So you need to drink at least 2  Big Gulp®'s of fluids every day.   If you have heart problems you may not be able to do this. Talk to your doctor about what you should do to prevent constipation. Drinking fruit juice like apple, pear, or prune juice gives you extra \"BANG\" for your beverage. These drinks are high in natural fiber. If you are a diabetic, drink sugar-free fluids with fiber additives (see next 2 points.)  Avoid drinking extra fruit juice unless this is a regular part of your diet plan. Eat extra fresh fruits and vegetables. Add extra fiber-products. Fiber products like Metamucil®, Citrucel®, Miralax® or Benefiber® can help. These products are over-the-counter and you do not need a prescription from your doctor. If you have followed these recommendations and still have some difficulty having a good poop, take and over-the-counter stimulant like Dulcolax® (biscodyl)  or Senokot® (senna concentrate). These may help get things moving. [facility] MEDICATION AND   SIDE EFFECT GUIDE    The [facility] MEDICATION AND SIDE EFFECT GUIDE was provided to the PATIENT AND CARE PROVIDER. Information provided includes instruction about drug purpose and common side effects for the following medications:   Norco  Aspirin  Bactrim      Medication information added to discharge record on March 9, 2023 at 11:44 AM.      Some information we wish all of our patients to be familiar with and General Information for Healthy Lifestyle choices:    Make a list of your current medications with your Primary Care Provider. Update this list whenever your medications are discontinued, doses are changed, or new medications (including over-the-counter products like ibuprofen, vitamins, or herbal remedies) are added. Carry medication information at all times in case of emergency situations      No smoking / No tobacco products / Avoid exposure to second hand smoke    Surgeon General's Warning:  Quitting smoking now greatly reduces serious risk to your health.     Obesity, smoking, and sedentary lifestyle greatly increases your risk for illness. A healthy diet, regular physical exercise & weight monitoring are important for maintaining a healthy lifestyle. A Note About Congestive Heart Failure: You may be retaining fluid if you have a history of heart failure or if you experience any of the following symptoms:      Weight gain of 3 pounds or more overnight or 5 pounds in a week  Increased swelling in our hands or feet  Shortness of breath while lying flat in bed      Please call your doctor as soon as you notice any of these symptoms; do not wait until your next office visit. A Note About Strokes:  Recognize signs and symptoms of STROKE. The simple mnemonic, F.A.S.T., can help you remember signs of a stroke and what to do if you suspect a stroke is occuring to you or someone you are with:    F - Face looks uneven  A - Arms unable to move, or move evenly  S - Speech is slurred or non-existent  T - Time - CALL 911 as soon as signs and symptoms begin - DO NOT go to bed or wait to see if you get better - TIME IS BRAIN. Warning Signs of HEART ATTACK   Call 911 if you have these symptoms:    Chest discomfort. Most heart attacks involve discomfort in the center of the chest that lasts more than a few minutes, or that goes away and comes back. It can feel like uncomfortable pressure, squeezing, fullness, or pain. Discomfort in other areas of the upper body. Symptoms can include pain or discomfort in one or both arms, the back, neck, jaw, or stomach. Shortness of breath with or without chest discomfort. Other signs may include breaking out in a cold sweat, nausea, or lightheadedness. Don't wait more than five minutes to call 911 - MINUTES MATTER! Fast action can save your life. Calling 911 is almost always the fastest way to get lifesaving treatment.  Emergency Medical Services staff can begin treatment when they arrive -- up to an hour sooner than if someone gets to the hospital by car. Learning About Coronavirus [phone])  Coronavirus [phone]): Overview  What is coronavirus (COVID-19)? The coronavirus disease (COVID-19) is caused by a virus. It is an illness that was first found in [place], [place], in December 2019. It has since spread worldwide. The virus can cause fever, cough, and trouble breathing. In severe cases, it can cause pneumonia and make it hard to breathe without help. It can cause death. Coronaviruses are a large group of viruses. They cause the common cold. They also cause more serious illnesses like Middle East respiratory syndrome (MERS) and severe acute respiratory syndrome (SARS). COVID-19 is caused by a novel coronavirus. That means it's a new type that has not been seen in people before. This virus spreads person-to-person through droplets from coughing and sneezing. It can also spread when you are close to someone who is infected. And it can spread when you touch something that has the virus on it, such as a doorknob or a tabletop. What can you do to protect yourself from coronavirus (COVID-19)? The best way to protect yourself from getting sick is to: Avoid areas where there is an outbreak. Avoid contact with people who may be infected. Wash your hands often with soap or alcohol-based hand sanitizers. Avoid crowds and try to stay at least 6 feet away from other people. Wash your hands often, especially after you cough or sneeze. Use soap and water, and scrub for at least 20 seconds. If soap and water aren't available, use an alcohol-based hand . Avoid touching your mouth, nose, and eyes. What can you do to avoid spreading the virus to others? To help avoid spreading the virus to others:  Cover your mouth with a tissue when you cough or sneeze. Then throw the tissue in the trash. Use a disinfectant to clean things that you touch often. Stay home if you are sick or have been exposed to the virus.  Don't go to school, work, or public areas. And don't use public transportation. If you are sick:  Leave your home only if you need to get medical care. But call the doctor's office first so they know you're coming. And wear a face mask, if you have one. If you have a face mask, wear it whenever you're around other people. It can help stop the spread of the virus when you cough or sneeze. Clean and disinfect your home every day. Use household  and disinfectant wipes or sprays. Take special care to clean things that you grab with your hands. These include doorknobs, remote controls, phones, and handles on your refrigerator and microwave. And don't forget countertops, tabletops, bathrooms, and computer keyboards. When to call for help  Call 911 anytime you think you may need emergency care. For example, call if:  You have severe trouble breathing. (You can't talk at all.)  You have constant chest pain or pressure. You are severely dizzy or lightheaded. You are confused or can't think clearly. Your face and lips have a blue color. You pass out (lose consciousness) or are very hard to wake up. Call your doctor now if you develop symptoms such as:  Shortness of breath. Fever. Cough. If you need to get care, call ahead to the doctor's office for instructions before you go. Make sure you wear a face mask, if you have one, to prevent exposing other people to the virus. Where can you get the latest information? The following health organizations are tracking and studying this virus. Their websites contain the most up-to-date information. Janice Cardenas also learn what to do if you think you may have been exposed to the virus. U.S. Centers for Disease Control and Prevention (CDC): The CDC provides updated news about the disease and travel advice. The website also tells you how to prevent the spread of infection. www.cdc.gov  World Health Organization [facility]): WHO offers information about the virus outbreaks. WHO also has travel advice. www.who.int  Current as of: April 1, 2020               Content Version: 12.4  © 3154-6604 [employer]. Care instructions adapted under license by your healthcare professional. If you have questions about a medical condition or this instruction, always ask your healthcare professional. [address] any warranty or liability for your use of this information. AT THE COMPLETION OF DISCHARGE INSTRUCTION REVIEW, WE VERIFY:  The discharge information has been reviewed with the patient and caregiver. Questions have been asked and answered meeting patient and caregiver expectations. The patient and caregiver verbalized understanding. Your discharge nurse was Racheal Coleman RN-BC       Board Certified - Pain Management      CONTENTS FOUND IN YOUR DISCHARGE ENVELOPE:  [x]     Surgeon and Hospital Discharge Instructions  []     [facility] Care Provider Card  [x]     Medication & Side Effect Guide            (your newly prescribed medications have been marked/highlighted showing the most common side effects from the classes of drugs on your prescriptions)  [x]     Medication Prescription(s) x 3 ( [x] These have been sent electronically to your pharmacy by your surgeon,   - OR -       your surgeon has already provided these to you during a previous/pre-op office visit)  [x]     Pain block and/or block with On-Q Catheter from Anesthesia Service (information included in your instructions above)        []    EXPAREL Education Information  []     Physical Therapy Prescription  []     Follow-up Appointment Cards  []     Surgery-related Pictures/Media  [x]     Medical device information sheets/pamphlets from their    []     School/work excuse note. []     /parent work excuse note.       The following personal items collected during your admission for safe keeping are returned to you:     Dental Appliance: Dental Appliances: None  Vi kei: Visual Aid: Glasses  Hearing Aid:    Jewelry: Jewelry: None  Clothing: Clothing: Footwear, Jacket/Coat, Pants, Shirt, Undergarments, Socks  Other Valuables:  Other Valuables: Eyeglasses (to locker)  Valuables sent to safe:

## 2023-03-09 NOTE — INTERVAL H&P NOTE
Date of Surgery Update:  Darrius He was seen and examined. History and physical has been reviewed. The patient has been examined. There have been no significant clinical changes since the completion of the originally dated History and Physical.    Shoulder Arthroplasty   Darrius He presented with advanced degenerative joint disease of the shoulder with radiographic evidence of severe joint space narrowing. Previous non-operative treatments have been tried including rest, ice, activity modifications, pain medications, and injectable treatments. The pain and impairment have progressively worsened now limiting ADLS and having a negative impact on quality of life. The risks, benefits and potential complications of the procedure have been discussed with the patient and all questions have been answered satisfactorily. The patient was counseled at length about the risks of dedra Covid-19 during their perioperative period and any recovery window from their procedure. The patient was made aware that dedra Covid-19  may worsen their prognosis for recovering from their procedure and lend to a higher morbidity and/or mortality risk. All material risks, benefits, and reasonable alternatives including postponing the procedure were discussed. The patient does  wish to proceed with the procedure at this time.       Signed By: Cindy Santana MD     March 9, 2023 7:53 AM

## 2023-03-09 NOTE — ANESTHESIA POSTPROCEDURE EVALUATION
Procedure(s):  LEFT TOTAL SHOULDER ARTHROPLASTY WITH BICEPS TENODESIS AND AXILLARY NERVE DISSECTION (GENERAL W/EXPAREL REGIONAL NERVE BLOCK). general, regional    Anesthesia Post Evaluation      Multimodal analgesia: multimodal analgesia used between 6 hours prior to anesthesia start to PACU discharge  Patient location during evaluation: bedside  Patient participation: complete - patient participated  Level of consciousness: awake and sleepy but conscious  Pain score: 1  Pain management: adequate  Airway patency: patent  Anesthetic complications: no  Cardiovascular status: acceptable  Respiratory status: acceptable  Hydration status: acceptable  Comments: Immediate cv/pulm status within acceptable preop limits. Post anesthesia nausea and vomiting:  controlled  Final Post Anesthesia Temperature Assessment:  Normothermia (36.0-37.5 degrees C)      INITIAL Post-op Vital signs:   Vitals Value Taken Time   /83 03/09/23 1303   Temp 36.6 °C (97.9 °F) 03/09/23 1248   Pulse 80 03/09/23 1303   Resp 17 03/09/23 1303   SpO2 100 % 03/09/23 1303   Vitals shown include unvalidated device data.

## 2023-03-09 NOTE — OP NOTES
Date of Procedure: 3/9/2023   Preoperative Diagnosis: Osteoarthritis of left glenohumeral joint [M19.012]  Postoperative Diagnosis: Osteoarthritis of left glenohumeral joint [M19.012] , BICEPS TENDONITIS   Procedure: Procedure(s):  LEFT TOTAL SHOULDER ARTHROPLASTY WITH BICEPS TENODESIS AND AXILLARY NERVE DISSECTION (GENERAL W/EXPAREL REGIONAL NERVE BLOCK) , BICEPS TENODESIS, AXILLARY NERVE DISSECTION  Surgeon: Candis Marc MD  Assistant(s): Ady Gillespie PA-C, ATC  was necessary for positioning, draping, retraction, closure, and brace placement. Anesthesia: General   Estimated Blood Loss: 100cc  Specimens: * No specimens in log *   Complications: None  Implants:  Implant Name Type Inv. Item Serial No.  Lot No. LRB No. Used Action   BROADBAND LOOP   N/A [employer] [id number] Left 1 Implanted   BROADBAND LOOP   N/A [employer] [id number] Left 1 Implanted   COMPONENT KYLER 4 PEG 5 SHLDR MOD - SN/A  COMPONENT KYLER 4 PEG 5 SHLDR MOD N/A [employer]_ [id number] Left 1 Implanted   COMP PROSTHESIS GLENOID TRABECULAR SHOULDER PPS - SN/A Joint Component COMP PROSTHESIS GLENOID TRABECULAR SHOULDER PPS N/A [employer]  [employer]_CR [id number] Left 1 Implanted   CEMENT BNE SIMPLEX W/GENT -- 10/PK - SN/A  CEMENT BNE SIMPLEX W/GENT -- 10/PK N/A [employer] [facility]_ [id number] Left 1 Implanted   HEAD HUM BPLR [id number] -- VERSA-DIAL - SN/A  HEAD HUM BPLR [id number] -- VERSA-DIAL N/A [employer]_ [id number] Left 1 Implanted   STEM HUM FLOR MICRO 11MM -- COMPREHENSIVE - SN/A  STEM HUM FLOR MICRO 11MM -- COMPREHENSIVE N/A [employer]_ [id number] Left 1 Implanted   HEAD HUM VERSA-DIAL STD TAPR --  - SN/A  HEAD HUM VERSA-DIAL STD TAPR --  N/A [employer]_ [id number] Left 1 Implanted         INDICATIONS:  The patient is a patient of mine who has had a long history of left shoulder pain.  After failure of  conservative treatment, the patient presents for definitive operative care. DESCRIPTION OF PROCEDURE:  After being informed of the risks and benefits, which include but are not limited to bleeding, infection, neurovascular damage, wound complications, pain and stiffness in the shoulder, periprosthetic loosening, fracture and dislocation the patient consented for the procedure. After adequate general anesthesia, herleft shoulder was prepped and draped in a sterile fashion. A standard deltopectoral incision was then made, and the cephalic vein was identified and retracted. The subdeltoid and subcoracoid spaces were identified and retracted. The bicipital sheath was then incised, and the biceps tendon was identified and noted to be fairly frayed. We proceeded with a biceps tenodesis. We used #2FiberWire and tenodesed the tendon down to the pectoralis major tendon. The bicipital sheath was then incised and carried proximally through the rotator interval. The rotator interval was then incised to the base of the coracoid. The lesser tuberosity was then drilled, and the lesser tuberosity osteotomy was then performed using an osteotome. We externally rotated the arm, and the anterior-inferior capsule was then debrided off of the neck, allowing the humeral head to dislocate. The anatomic neck was then marked with a bovie then cut with an oscillating saw. We then serially reamed and broached the humerus. This was done to the appropriate size, obtaining excellent chatter and fit of the proximal humerus. At that point we used a calcar planer to finalize the version and the height of the humeral cut. Care was taken to be sure that the supraspinatus fibers inserted directly into the level of the resection, and no excess bone was present. The glenoid was then addressed. A laminar  was inserted, and the axillary nerve was identified as a coursed off of the posterior cord of the brachial plexus.  The nerve was followed along the anterior-inferior capsule as it coursed into the posterior subdeltoid space. The nerve was then protected, and then the anterior-inferior capsule was then incised carefully. A complete release was performed all of the way through the posterior capsule. Retractors were placed, and the labrum was excised. Care was taken to remove any glenoid osteophytes with a bur and rongeur. We marked the glenoid, and then the center of the glenoid was drilled. We serially reamed the glenoid, taking care to compensate for any amount of retroversion as identified in the preoperative CT scan. Once the reaming was complete the excess bone was then removed with a rongeur and a bur, and the peripheral peg holes were then drilled. The central large peg was then drilled as well. The glenoid was then irrigated copiously, and hemostasis was obtained with thrombin and epinephrine. The appropriately sized glenoid was cemented into position using 3rd generation cementing techniques. We used a Erendira syringe to pressurize the cement within the peg holes. All excess cement was debrided, and pressure was held on the implant until the cement was allowed to dry. We then serially trialed the humeral head. Care was taken to obtain the full 180 degrees of forward elevation, 60 degrees of internal rotation in the abducted position, 45 degrees of external rotation in the neutral position with the subscapularis reduced, and 50% subluxation posteriorly with spontaneous reduction. After this was obtained the trials were then removed. A #5 FiberWire was placed through the medial aspect of the osteotomy site, and the appropriate stem was inserted into the humerus. Care was taken to be sure that this medial suture was passed around the stem. We then repaired the lesser tuberosity osteotomy using the backpack technique. We tied the sutures over a button laterally to prevent pullout.  The medial sutures were passed through the osteotendinous junction in a mattress fashion and tied down as well. The lateral rotator interval was closed with #2 FiberWire, and the wound was then irrigated copiously with antibiotic irrigation. The deltopectoral interval was then closed. Ester Fontenot PA-C  was necessary for this case. She assisted with positioning, draping, retraction, exposure, implant management, implant insertion would closure, and brace placement. The skin was closed in layers. Sterile dressings were applied and the patient was awakened, taken to the recovery room in stable condition. There were no complications.

## 2023-03-09 NOTE — ANESTHESIA PROCEDURE NOTES
Peripheral Block    Start time: 3/9/2023 9:12 AM  End time: 3/9/2023 9:19 AM  Performed by: Carter King MD  Authorized by: Carter King MD       Pre-procedure: Indications: at surgeon's request and post-op pain management    Preanesthetic Checklist: patient identified, risks and benefits discussed, site marked, timeout performed, anesthesia consent given, patient being monitored and fire risk safety assessment completed and verbalized    Timeout Time: 09:12 EST      Block Type:   Block Type: Interscalene  Laterality:  Left  Monitoring:  Continuous pulse ox, frequent vital sign checks, heart rate, responsive to questions and oxygen  Injection Technique:  Single shot  Procedures: ultrasound guided    Patient Position: supine  Prep: chlorhexidine    Location:  Interscalene  Needle Type:  Stimuplex  Needle Gauge:  22 G  Needle Localization:  Ultrasound guidance  Medication Injected:  Bupivacaine liposome (PF) susp (EXPAREL) infiltration - Peripheral Nerve Block   10 mL - 3/9/2023 9:19:00 AM  Med Admin Time: 3/9/2023 9:19 AM    Assessment:  Number of attempts:  1  Injection Assessment:  Incremental injection every 5 mL, local visualized surrounding nerve on ultrasound, negative aspiration for blood, no paresthesia and no intravascular symptoms  Patient tolerance:  Patient tolerated the procedure well with no immediate complications  Swathi WYLIE witnessed timeout and block written on correct side.

## 2023-05-16 ENCOUNTER — HOSPITAL ENCOUNTER (OUTPATIENT)
Facility: HOSPITAL | Age: 78
Discharge: HOME | End: 2023-05-19
Payer: MEDICARE

## 2023-05-16 DIAGNOSIS — Z12.31 SCREENING MAMMOGRAM FOR HIGH-RISK PATIENT: ICD-10-CM

## 2023-05-16 PROCEDURE — 77067 SCR MAMMO BI INCL CAD: CPT

## 2024-05-16 ENCOUNTER — HOSPITAL ENCOUNTER (OUTPATIENT)
Facility: HOSPITAL | Age: 79
End: 2024-05-16
Payer: MEDICARE

## 2024-05-16 ENCOUNTER — HOSPITAL ENCOUNTER (OUTPATIENT)
Facility: HOSPITAL | Age: 79
Discharge: HOME | End: 2024-05-16
Payer: MEDICARE

## 2024-05-16 DIAGNOSIS — Z12.31 ENCOUNTER FOR SCREENING MAMMOGRAM FOR MALIGNANT NEOPLASM OF BREAST: ICD-10-CM

## 2024-05-16 DIAGNOSIS — N95.8 POSTARTIFICIAL MENOPAUSAL SYNDROME: ICD-10-CM

## 2024-05-16 PROCEDURE — 77080 DXA BONE DENSITY AXIAL: CPT

## 2024-05-16 PROCEDURE — 77063 BREAST TOMOSYNTHESIS BI: CPT

## 2025-07-01 ENCOUNTER — TRANSCRIBE ORDERS (OUTPATIENT)
Facility: HOSPITAL | Age: 80
End: 2025-07-01

## 2025-07-01 ENCOUNTER — HOSPITAL ENCOUNTER (OUTPATIENT)
Facility: HOSPITAL | Age: 80
Discharge: HOME | End: 2025-07-04
Payer: MEDICARE

## 2025-07-01 DIAGNOSIS — Z12.31 OTHER SCREENING MAMMOGRAM: ICD-10-CM

## 2025-07-01 DIAGNOSIS — Z12.31 OTHER SCREENING MAMMOGRAM: Primary | ICD-10-CM

## 2025-07-01 PROCEDURE — 77063 BREAST TOMOSYNTHESIS BI: CPT

## (undated) DEVICE — SET GRAV CK VLV NEEDLESS ST 3 GANGED 4WAY STPCOCK HI FLO 10

## (undated) DEVICE — SNARE ENDOSCP M L240CM W27MM SHTH DIA2.4MM CHN 2.8MM OVL

## (undated) DEVICE — SHEET, DRAPE, SPLIT, STERILE: Brand: MEDLINE

## (undated) DEVICE — TUBING SUCT 10FR MAL ALUM SHFT FN CAP VENT UNIV CONN W/ OBT

## (undated) DEVICE — T-MAX DISPOSABLE FACE MASK 8 PER BOX

## (undated) DEVICE — 1200 GUARD II KIT W/5MM TUBE W/O VAC TUBE: Brand: GUARDIAN

## (undated) DEVICE — GLOVE SURG SZ 9 L12IN FNGR THK126MIL CRM LTX FREE

## (undated) DEVICE — SIMPLICITY FLUFF UNDERPAD 23X36, MODERATE: Brand: SIMPLICITY

## (undated) DEVICE — 3M™ TEGADERM™ TRANSPARENT FILM DRESSING FRAME STYLE, 1628, 6 IN X 8 IN (15 CM X 20 CM), 10/CT 8CT/CASE: Brand: 3M™ TEGADERM™

## (undated) DEVICE — SUTURE VCRL SZ 2-0 L27IN ABSRB UD L36MM CP-1 1/2 CIR REV J266H

## (undated) DEVICE — T4 HOOD

## (undated) DEVICE — SUTURE FIBERWIRE SZ 5 L38IN BLU L48MM 1/2 CIR CONVENTIONAL AR7213

## (undated) DEVICE — SPONGE GZ W4XL4IN COT 12 PLY TYP VII WVN C FLD DSGN

## (undated) DEVICE — SOL INJ SOD CL 0.9% 1000ML BG --

## (undated) DEVICE — YANKAUER,OPEN TIP,W/O VENT,STERILE: Brand: MEDLINE INDUSTRIES, INC.

## (undated) DEVICE — Device

## (undated) DEVICE — BIT DRL L5IN DIA2.8MM STD ST S STL TWST BUSA

## (undated) DEVICE — 450 ML BOTTLE OF 0.05% CHLORHEXIDINE GLUCONATE IN 99.95% STERILE WATER FOR IRRIGATION, USP AND APPLICATOR.: Brand: IRRISEPT ANTIMICROBIAL WOUND LAVAGE

## (undated) DEVICE — GLOVE SURG SZ 65 L12IN FNGR THK126MIL CRM LTX FREE

## (undated) DEVICE — GOWN,BREATHABLE,IMP SLV 3XL AURORA: Brand: MEDLINE

## (undated) DEVICE — TOTAL JOINT-SFMC: Brand: MEDLINE INDUSTRIES, INC.

## (undated) DEVICE — ADULT SPO2 SENSOR: Brand: NELLCOR

## (undated) DEVICE — 3M™ MEDIPORE™ H SOFT CLOTH SURGICAL TAPE 2864, 4 INCH X 10 YARD (10CM X 9,14M), 12 ROLLS/CASE: Brand: 3M™ MEDIPORE™

## (undated) DEVICE — BAG SPEC BIOHZRD 10 X 10 IN --

## (undated) DEVICE — CONTAINER SPEC 20 ML LID NEUT BUFF FORMALIN 10 % POLYPR STS

## (undated) DEVICE — POLYP TRAP: Brand: TRAPEASE®

## (undated) DEVICE — (D)SENSOR RMFG 02 PULS OXMTR -- DISC BY MFR USE ITEM 133445

## (undated) DEVICE — DRAPE,U/ SHT,SPLIT,PLAS,STERIL: Brand: MEDLINE

## (undated) DEVICE — SUTURE FIBERWIRE SZ 2 W/ TAPERED NEEDLE BLUE L38IN NONABSORB BLU L26.5MM 1/2 CIRCLE AR7200

## (undated) DEVICE — BAG BELONG PT PERS CLEAR HANDL

## (undated) DEVICE — SOLUTION IRRIG 3000ML 0.9% SOD CHL USP UROMATIC PLAS CONT

## (undated) DEVICE — ROCKER SWITCH PENCIL BLADE ELECTRODE, HOLSTER: Brand: EDGE

## (undated) DEVICE — HOOD, PEEL-AWAY: Brand: FLYTE

## (undated) DEVICE — KIT COLON W/ 1.1OZ LUB AND 2 END

## (undated) DEVICE — COVER,MAYO STAND,STERILE: Brand: MEDLINE

## (undated) DEVICE — GLOVE SURG SZ 7 L12IN FNGR THK79MIL GRN LTX FREE

## (undated) DEVICE — PAD,ABDOMINAL,5"X9",ST,LF,25/BX: Brand: MEDLINE INDUSTRIES, INC.

## (undated) DEVICE — 4.0MM EGG

## (undated) DEVICE — 3M™ CUROS™ DISINFECTING CAP FOR NEEDLELESS CONNECTORS 270/CARTON 20 CARTONS/CASE CFF1-270: Brand: CUROS™

## (undated) DEVICE — PAD,NON-ADHERENT,3X8,STERILE,LF,1/PK: Brand: MEDLINE

## (undated) DEVICE — CATH IV AUTOGRD BC PNK 20GA 25 -- INSYTE

## (undated) DEVICE — SPONGE GZ W4XL4IN COT 12 PLY TYP VII WVN C FLD DSGN STERILE

## (undated) DEVICE — CANN NASAL O2 CAPNOGRAPHY AD -- FILTERLINE

## (undated) DEVICE — 3M™ IOBAN™ 2 ANTIMICROBIAL INCISE DRAPE 6651EZ: Brand: IOBAN™ 2

## (undated) DEVICE — GAUZE SPNG XRAY 4X4IN 16PLY -- STRL

## (undated) DEVICE — SOLUTION IRRIG 1000ML 0.9% SOD CHL USP POUR PLAS BTL

## (undated) DEVICE — ELECTRODE,RADIOTRANSLUCENT,FOAM,3PK: Brand: MEDLINE

## (undated) DEVICE — BOOT ORTH XL KNEE GRN TYVEK HI CVR SKID RESIST HEAT SEAL E

## (undated) DEVICE — TUBING, SUCTION, 1/4" X 12', STRAIGHT: Brand: MEDLINE

## (undated) DEVICE — SPONGE GZ W4XL4IN COT RADPQ HIGHLY ABSRB

## (undated) DEVICE — STRYKER PERFORMANCE SERIES SAGITTAL BLADE: Brand: STRYKER PERFORMANCE SERIES

## (undated) DEVICE — 4-PORT MANIFOLD: Brand: NEPTUNE 2

## (undated) DEVICE — SUTURE VCRL + SZ 1-0 L36IN ABSRB UD CTX 1/2 CIR TAPR PNT VCP977H

## (undated) DEVICE — SUTURE MCRYL SZ 3-0 L27IN ABSRB UD L19MM PS-2 3/8 CIR PRIM Y427H

## (undated) DEVICE — GLOVE SURG SZ 9 L12IN FNGR THK79MIL GRN LTX FREE

## (undated) DEVICE — SUTURE N ABSRB LOOP 23 IN 1 IN 2.3 MM TAIL WHT BROADBAND

## (undated) DEVICE — PIN STEINMANN RVRS STL THRD TP --

## (undated) DEVICE — SYRINGE CATH TIP 50ML

## (undated) DEVICE — SOLIDIFIER MEDC 1200ML -- CONVERT TO 356117

## (undated) DEVICE — CUFF RMFG BP INF SZ 11 DISP -- LAWSON OEM ITEM 238915

## (undated) DEVICE — PLASMABLADE PS210-030S 3.0S LOCK: Brand: PLASMABLADE™

## (undated) DEVICE — KENDALL RADIOLUCENT FOAM MONITORING ELECTRODE -RECTANGULAR SHAPE: Brand: KENDALL